# Patient Record
Sex: MALE | Race: WHITE | NOT HISPANIC OR LATINO | Employment: FULL TIME | URBAN - METROPOLITAN AREA
[De-identification: names, ages, dates, MRNs, and addresses within clinical notes are randomized per-mention and may not be internally consistent; named-entity substitution may affect disease eponyms.]

---

## 2020-12-02 ENCOUNTER — OFFICE VISIT (OUTPATIENT)
Dept: INTERNAL MEDICINE | Facility: CLINIC | Age: 50
End: 2020-12-02

## 2020-12-02 VITALS
DIASTOLIC BLOOD PRESSURE: 70 MMHG | OXYGEN SATURATION: 99 % | TEMPERATURE: 97.1 F | HEIGHT: 70 IN | HEART RATE: 66 BPM | RESPIRATION RATE: 18 BRPM | WEIGHT: 200 LBS | SYSTOLIC BLOOD PRESSURE: 122 MMHG | BODY MASS INDEX: 28.63 KG/M2

## 2020-12-02 DIAGNOSIS — F41.1 GENERALIZED ANXIETY DISORDER: ICD-10-CM

## 2020-12-02 DIAGNOSIS — M25.512 ACUTE PAIN OF LEFT SHOULDER: Primary | ICD-10-CM

## 2020-12-02 DIAGNOSIS — N32.81 OVERACTIVE BLADDER: ICD-10-CM

## 2020-12-02 PROBLEM — K44.9 HIATAL HERNIA: Status: ACTIVE | Noted: 2020-12-02

## 2020-12-02 PROCEDURE — 99204 OFFICE O/P NEW MOD 45 MIN: CPT | Performed by: NURSE PRACTITIONER

## 2020-12-02 RX ORDER — PANTOPRAZOLE SODIUM 40 MG/1
TABLET, DELAYED RELEASE ORAL
COMMUNITY
Start: 2020-11-27 | End: 2021-09-30 | Stop reason: SDUPTHER

## 2020-12-02 RX ORDER — OXYBUTYNIN CHLORIDE 10 MG/1
TABLET, EXTENDED RELEASE ORAL
COMMUNITY
End: 2020-12-02 | Stop reason: SDUPTHER

## 2020-12-02 RX ORDER — FAMOTIDINE 20 MG/1
TABLET, FILM COATED ORAL
COMMUNITY
End: 2021-03-11 | Stop reason: SDUPTHER

## 2020-12-02 RX ORDER — OXYBUTYNIN CHLORIDE 10 MG/1
TABLET, EXTENDED RELEASE ORAL
COMMUNITY
Start: 2020-11-28 | End: 2021-03-12

## 2020-12-02 RX ORDER — TADALAFIL 5 MG/1
TABLET ORAL
COMMUNITY
End: 2021-06-08 | Stop reason: SDUPTHER

## 2020-12-02 RX ORDER — MIRTAZAPINE 15 MG/1
TABLET, FILM COATED ORAL
COMMUNITY
End: 2022-08-15

## 2020-12-02 RX ORDER — METHYLPREDNISOLONE 4 MG/1
TABLET ORAL
Qty: 21 TABLET | Refills: 0 | Status: SHIPPED | OUTPATIENT
Start: 2020-12-02 | End: 2020-12-11

## 2020-12-02 RX ORDER — HYDROXYZINE HYDROCHLORIDE 25 MG/1
25 TABLET, FILM COATED ORAL EVERY 8 HOURS PRN
Qty: 60 TABLET | Refills: 1 | Status: SHIPPED | OUTPATIENT
Start: 2020-12-02 | End: 2021-03-12

## 2020-12-02 NOTE — PROGRESS NOTES
Subjective   Clarence Hoang is a 50 y.o. male. Patient is here today for   Chief Complaint   Patient presents with   • Shoulder Pain   • Anxiety   • Establish Care          Vitals:    12/02/20 1003   BP: 122/70   Pulse: 66   Resp: 18   Temp: 97.1 °F (36.2 °C)   SpO2: 99%     Body mass index is 28.7 kg/m².  The following portions of the patient's history were reviewed and updated as appropriate: allergies, current medications, past family history, past medical history, past social history, past surgical history and problem list.    History reviewed. No pertinent past medical history.   No Known Allergies   Social History     Socioeconomic History   • Marital status: Single     Spouse name: Not on file   • Number of children: Not on file   • Years of education: Not on file   • Highest education level: Not on file   Tobacco Use   • Smoking status: Former Smoker     Packs/day: 2.00     Years: 30.00     Pack years: 60.00     Types: Cigarettes     Quit date: 1/1/2017     Years since quitting: 3.9   • Smokeless tobacco: Never Used   Substance and Sexual Activity   • Alcohol use: Not Currently        Current Outpatient Medications:   •  famotidine (PEPCID) 20 MG tablet, , Disp: , Rfl:   •  mirtazapine (REMERON) 15 MG tablet, , Disp: , Rfl:   •  oxybutynin XL (DITROPAN-XL) 10 MG 24 hr tablet, , Disp: , Rfl:   •  pantoprazole (PROTONIX) 40 MG EC tablet, , Disp: , Rfl:   •  tadalafil (CIALIS) 5 MG tablet, , Disp: , Rfl:   •  hydrOXYzine (ATARAX) 25 MG tablet, Take 1 tablet by mouth Every 8 (Eight) Hours As Needed for Anxiety., Disp: 60 tablet, Rfl: 1  •  methylPREDNISolone (MEDROL) 4 MG dose pack, Take as directed on package instructions., Disp: 21 tablet, Rfl: 0     Objective     Norberto is a 50 year old male new patient who is here to establish care. His previous PCP was at the [facility]. I do not have previous records to review. He has generalized anxiety and has been on remeron 15 mg twice daily for over a year.  He states he continues to have anxiety and panic attacks. He has taken celexa in the past which did not work well. He requests an rx for alprazolam to take as needed. He has not seen a psychiatrist in the past. He denies depression.   He has a history of overactive bladder and has seen a urologist. He is taking Ditropan xl with little relief. He was scheduled for further testing at his urologist's office but had to cancel due to covid pandemic.   He also c/o showerd    Shoulder Injury   The left shoulder is affected. There was no injury mechanism (is unsure of any known injury but may have occured when working out ). The quality of the pain is described as aching. The pain does not radiate. The pain is moderate. Pertinent negatives include no chest pain, muscle weakness, numbness or tingling. The symptoms are aggravated by movement and overhead lifting (picking up things ). He has tried rest for the symptoms. The treatment provided no relief.        Review of Systems   Constitutional: Negative for fatigue.   HENT: Negative.    Respiratory: Negative.    Cardiovascular: Negative for chest pain.   Gastrointestinal: Negative.    Genitourinary: Positive for frequency and urgency.   Musculoskeletal:        Left shoulder pain    Neurological: Negative for tingling and numbness.   Psychiatric/Behavioral: Positive for sleep disturbance. Negative for dysphoric mood. The patient is nervous/anxious.        Physical Exam  Vitals signs and nursing note reviewed.   Constitutional:       General: He is awake.      Appearance: Normal appearance. He is well-developed and well-groomed.   HENT:      Head: Normocephalic.   Eyes:      General: Lids are normal.   Cardiovascular:      Rate and Rhythm: Normal rate and regular rhythm.   Pulmonary:      Effort: Pulmonary effort is normal.      Breath sounds: Normal breath sounds.   Musculoskeletal:      Left shoulder: He exhibits decreased range of motion and tenderness. He exhibits no  swelling, no deformity and normal strength.   Neurological:      Mental Status: He is alert.   Psychiatric:         Behavior: Behavior is cooperative.         ASSESSMENT     Problems Addressed this Visit     Generalized anxiety disorder    Relevant Medications    mirtazapine (REMERON) 15 MG tablet    hydrOXYzine (ATARAX) 25 MG tablet      Other Visit Diagnoses     Acute pain of left shoulder    -  Primary    Relevant Orders    Ambulatory Referral to Physical Therapy Evaluate and treat    Overactive bladder        Relevant Medications    oxybutynin XL (DITROPAN-XL) 10 MG 24 hr tablet    tadalafil (CIALIS) 5 MG tablet    Other Relevant Orders    Ambulatory Referral to Urology      Diagnoses       Codes Comments    Acute pain of left shoulder    -  Primary ICD-10-CM: M25.512  ICD-9-CM: 719.41     Overactive bladder     ICD-10-CM: N32.81  ICD-9-CM: 596.51     Generalized anxiety disorder     ICD-10-CM: F41.1  ICD-9-CM: 300.02           PLAN  1. Generalized anxiety- will get previous records from pcp, he can continue current dose of remeron for now, he can take hydroxyzine as needed for panic attacks. Advised not to drive while taking it as it can cause drowsiness.    Discussed other medication options . May refer to psychiatry if needed.   2. Left shoulder pain- referred to PT at [place],. He has seen wilberto brown in the past   3. Overactive bladder- schedule an appt to follow up with urology   Return for Annual physical, with labs , need records from previous PCP.

## 2020-12-03 DIAGNOSIS — Z00.00 ROUTINE HEALTH MAINTENANCE: Primary | ICD-10-CM

## 2020-12-03 DIAGNOSIS — Z11.59 NEED FOR HEPATITIS C SCREENING TEST: ICD-10-CM

## 2020-12-08 ENCOUNTER — HOSPITAL ENCOUNTER (OUTPATIENT)
Dept: PHYSICAL THERAPY | Facility: HOSPITAL | Age: 50
Setting detail: THERAPIES SERIES
Discharge: HOME | End: 2020-12-08

## 2020-12-08 DIAGNOSIS — M25.512 ACUTE PAIN OF LEFT SHOULDER: Primary | ICD-10-CM

## 2020-12-08 LAB
ALBUMIN SERPL-MCNC: 4.9 G/DL (ref 3.5–5.2)
ALBUMIN/GLOB SERPL: 2.1 G/DL
ALP SERPL-CCNC: 44 U/L (ref 39–117)
ALT SERPL-CCNC: 28 U/L (ref 1–41)
APPEARANCE UR: CLEAR
AST SERPL-CCNC: 15 U/L (ref 1–40)
BACTERIA #/AREA URNS HPF: NORMAL /HPF
BASOPHILS # BLD AUTO: 0.03 10*3/MM3 (ref 0–0.2)
BASOPHILS NFR BLD AUTO: 0.3 % (ref 0–1.5)
BILIRUB SERPL-MCNC: 0.2 MG/DL (ref 0–1.2)
BILIRUB UR QL STRIP: NEGATIVE
BUN SERPL-MCNC: 18 MG/DL (ref 6–20)
BUN/CREAT SERPL: 17.5 (ref 7–25)
CALCIUM SERPL-MCNC: 9.6 MG/DL (ref 8.6–10.5)
CASTS URNS MICRO: NORMAL
CHLORIDE SERPL-SCNC: 102 MMOL/L (ref 98–107)
CHOLEST SERPL-MCNC: 206 MG/DL (ref 0–200)
CO2 SERPL-SCNC: 30.5 MMOL/L (ref 22–29)
COLOR UR: YELLOW
CREAT SERPL-MCNC: 1.03 MG/DL (ref 0.76–1.27)
EOSINOPHIL # BLD AUTO: 0 10*3/MM3 (ref 0–0.4)
EOSINOPHIL NFR BLD AUTO: 0 % (ref 0.3–6.2)
EPI CELLS #/AREA URNS HPF: NORMAL /HPF
ERYTHROCYTE [DISTWIDTH] IN BLOOD BY AUTOMATED COUNT: 13 % (ref 12.3–15.4)
GLOBULIN SER CALC-MCNC: 2.3 GM/DL
GLUCOSE SERPL-MCNC: 101 MG/DL (ref 65–99)
GLUCOSE UR QL: NEGATIVE
HCT VFR BLD AUTO: 44.3 % (ref 37.5–51)
HCV AB S/CO SERPL IA: <0.1 S/CO RATIO (ref 0–0.9)
HCV AB SERPL QL IA: NORMAL
HDLC SERPL-MCNC: 69 MG/DL (ref 40–60)
HGB BLD-MCNC: 14.9 G/DL (ref 13–17.7)
HGB UR QL STRIP: NEGATIVE
IMM GRANULOCYTES # BLD AUTO: 0.04 10*3/MM3 (ref 0–0.05)
IMM GRANULOCYTES NFR BLD AUTO: 0.4 % (ref 0–0.5)
KETONES UR QL STRIP: NEGATIVE
LDLC SERPL CALC-MCNC: 123 MG/DL (ref 0–100)
LDLC/HDLC SERPL: 1.75 {RATIO}
LEUKOCYTE ESTERASE UR QL STRIP: NEGATIVE
LYMPHOCYTES # BLD AUTO: 3.9 10*3/MM3 (ref 0.7–3.1)
LYMPHOCYTES NFR BLD AUTO: 35.4 % (ref 19.6–45.3)
MCH RBC QN AUTO: 30 PG (ref 26.6–33)
MCHC RBC AUTO-ENTMCNC: 33.6 G/DL (ref 31.5–35.7)
MCV RBC AUTO: 89.3 FL (ref 79–97)
MONOCYTES # BLD AUTO: 0.71 10*3/MM3 (ref 0.1–0.9)
MONOCYTES NFR BLD AUTO: 6.4 % (ref 5–12)
NEUTROPHILS # BLD AUTO: 6.33 10*3/MM3 (ref 1.7–7)
NEUTROPHILS NFR BLD AUTO: 57.5 % (ref 42.7–76)
NITRITE UR QL STRIP: NEGATIVE
NRBC BLD AUTO-RTO: 0 /100 WBC (ref 0–0.2)
PH UR STRIP: 6.5 [PH] (ref 5–8)
PLATELET # BLD AUTO: 246 10*3/MM3 (ref 140–450)
POTASSIUM SERPL-SCNC: 4.6 MMOL/L (ref 3.5–5.2)
PROT SERPL-MCNC: 7.2 G/DL (ref 6–8.5)
PROT UR QL STRIP: NEGATIVE
RBC # BLD AUTO: 4.96 10*6/MM3 (ref 4.14–5.8)
RBC #/AREA URNS HPF: NORMAL /HPF
SODIUM SERPL-SCNC: 142 MMOL/L (ref 136–145)
SP GR UR: 1.02 (ref 1–1.03)
TRIGL SERPL-MCNC: 80 MG/DL (ref 0–150)
TSH SERPL DL<=0.005 MIU/L-ACNC: 0.63 UIU/ML (ref 0.27–4.2)
UROBILINOGEN UR STRIP-MCNC: NORMAL MG/DL
VLDLC SERPL CALC-MCNC: 14 MG/DL (ref 5–40)
WBC # BLD AUTO: 11.01 10*3/MM3 (ref 3.4–10.8)
WBC #/AREA URNS HPF: NORMAL /HPF

## 2020-12-08 PROCEDURE — 97161 PT EVAL LOW COMPLEX 20 MIN: CPT | Performed by: PHYSICAL THERAPIST

## 2020-12-08 PROCEDURE — 97140 MANUAL THERAPY 1/> REGIONS: CPT | Performed by: PHYSICAL THERAPIST

## 2020-12-08 NOTE — THERAPY EVALUATION
"    Outpatient Physical Therapy Ortho Initial Evaluation  [facility]     Patient Name: Clarence Hoang  : 1970  MRN: [id number]  Today's Date: 2020      Visit Date: 2020    Patient Active Problem List   Diagnosis   • Hiatal hernia   • Generalized anxiety disorder        No past medical history on file.     Past Surgical History:   Procedure Laterality Date   • EYE SURGERY     • HERNIA REPAIR         Visit Dx:     ICD-10-CM ICD-9-CM   1. Acute pain of left shoulder  M25.512 719.41         Patient History     Row Name 20 1300 20 0711          History    Chief Complaint  --  Other 1 (comment)  -GR (r) patient (t)     Hx Chief Complaint Comment 1   --  Rotator cuff  issues  -GR (r) patient (t)     Type of Pain  --  Shoulder pain  -GR (r) patient (t)     Date Current Problem(s) Began  --  20  -GR (r) patient (t)     Brief Description of Current Complaint  1 month history of L shoulder pain, insidious onset he believes from doing too much weight lifting.  He has had a similar problem on the R and also has had deQuervain's and his hand will tingle or \"give out\" intermittently. He has responded well to DDN in the past for a leg problem and is interested in this service again.  Patient works as a  and would also like to return to total body weight lifting.  -GR  Pinching in the shoulder  -GR (r) patient (t)     Patient/Caregiver Goals  --  Relieve pain;Return to prior level of function  -GR (r) patient (t)     Hand Dominance  --  left-handed  -GR (r) patient (t)     Occupation/sports/leisure activities  --   / lift weights daily  -GR (r) patient (t)     Patient seeing anyone else for problem(s)?  --  No  -GR (r) patient (t)     What clinical tests have you had for this problem?  --  Other 1 (comment)  -GR (r) patient (t)     Additional Clinical Tests  --  None  -GR (r) patient (t)        Pain     Pain Location  Shoulder  -GR  --     Pain at Present "  0  -GR  --     Pain at Best  0  -GR  --     Pain at Worst  8  -GR  --     Is your sleep disturbed?  Yes  -GR  --        Fall Risk Assessment    Any falls in the past year:  --  No  -GR (r) patient (t)        Services    Prior Rehab/Home Health Experiences  --  No  -GR (r) patient (t)     Are you currently receiving Home Health services  --  No  -GR (r) patient (t)     Do you plan to receive Home Health services in the near future  --  No  -GR (r) patient (t)        Daily Activities    Primary Language  --  English  -GR (r) patient (t)     How does patient learn best?  --  Demonstration  -GR (r) patient (t)     Pt Participated in POC and Goals  --  Yes  -GR        Safety    Are you being hurt, hit, or frightened by anyone at home or in your life?  --  No  -GR (r) patient (t)     Are you being neglected by a caregiver  --  No  -GR (r) patient (t)     Have you had any of the following issues with  --  Anxiety  -GR (r) patient (t)       User Key  (r) = Recorded By, (t) = Taken By, (c) = Cosigned By    Initials Name Provider Type    GR Kvng Mcwilliams, PT Physical Therapist    patient Clarence Hoang --          PT Ortho     Row Name 12/08/20 1300       Special Tests/Palpation    Special Tests/Palpation  Shoulder  -GR       Shoulder Impingement/Rotator Cuff Special Tests    Devlin-Jg Test (RC Lesion vs. Bursitis)  Bilateral:;Negative  -GR    Neer Impingement Test (RC Lesion vs. Bursitis)  Left:;Positive mild  -GR    Drop Arm Test (Full Thickness RC Lesion)  Bilateral:;Negative  -GR       Shoulder Girdle Palpation    Shoulder Girdle Palpation?  Yes  -GR    Supraspinatus Insertion  Left:;Tender  -GR    Long Head of Biceps  Left:;Tender  -GR    Middle Trap  Left:;Tender  -GR    Rhomboid  Left:;Tender  -GR    Lower Trap  Left:;Tender  -GR       General ROM    RT Upper Ext  Rt Shoulder Flexion;Rt Shoulder ABduction;Rt Shoulder External Rotation;Rt Shoulder Internal Rotation;Rt Elbow Extension/Flexion  -GR    LT  Upper Ext  Lt Shoulder ABduction;Lt Shoulder Flexion;Lt Shoulder External Rotation;Lt Shoulder Internal Rotation;Lt Elbow Extension/Flexion  -GR       Right Upper Ext    Rt Shoulder Abduction AROM  180  -GR    Rt Shoulder Flexion AROM  180  -GR    Rt Shoulder External Rotation AROM  C7  -GR    Rt Shoulder Internal Rotation AROM  T10  -GR       Left Upper Ext    Lt Shoulder Abduction AROM  180  -GR    Lt Shoulder Flexion AROM  180  -GR    Lt Shoulder External Rotation AROM  C7  -GR    Lt Shoulder Internal Rotation AROM  T10  -GR       MMT (Manual Muscle Testing)    Rt Upper Ext  Rt Shoulder Flexion;Rt Shoulder ABduction;Rt Shoulder Internal Rotation;Rt Shoulder External Rotation  -GR    Lt Upper Ext  Lt Shoulder Flexion;Lt Shoulder ABduction;Lt Shoulder Internal Rotation;Lt Shoulder External Rotation  -GR       MMT Right Upper Ext    Rt Shoulder Flexion MMT, Gross Movement  (5/5) normal  -GR    Rt Shoulder ABduction MMT, Gross Movement  (5/5) normal  -GR    Rt Shoulder Internal Rotation MMT, Gross Movement  (5/5) normal  -GR    Rt Shoulder External Rotation MMT, Gross Movement  (5/5) normal  -GR       MMT Left Upper Ext    Lt Shoulder Flexion MMT, Gross Movement  (4+/5) good plus  -GR    Lt Shoulder ABduction MMT, Gross Movement  (4/5) good  -GR    Lt Shoulder Internal Rotation MMT, Gross Movement  (5/5) normal  -GR    Lt Shoulder External Rotation MMT, Gross Movement  (3-/5) fair minus pain  -GR    Lt Upper Extremity Comments   periscap 4-/5  -GR      User Key  (r) = Recorded By, (t) = Taken By, (c) = Cosigned By    Initials Name Provider Type    Kvng Cat, PT Physical Therapist                      Therapy Education  Education Details: medbridge HEP [id number]  Given: HEP, Symptoms/condition management, Posture/body mechanics  Program: New  How Provided: Verbal  Provided to: Patient  Level of Understanding: Teach back education performed, Verbalized, Demonstrated     PT OP Goals     Row Name 12/08/20 1600           PT Short Term Goals    STG Date to Achieve  12/23/20  -GR     STG 1  Patient will be independent with initial HEP.  -GR     STG 1 Progress  New  -GR     STG 2  Patient will comply to activity modification for optimal healing.  -GR     STG 2 Progress  New  -GR     STG 3  Patient will report 25% or greater improvement in sleep per shoulder pain.  -GR     STG 3 Progress  New  -GR        Long Term Goals    LTG Date to Achieve  01/22/21  -GR     LTG 1  Patient will be independent with progressive HEP for long term management of current condition.  -GR     LTG 1 Progress  New  -GR     LTG 2  Patient will score </= 26% disability on the quickDASH to indicate improved perceived performance with ADLs.  -GR     LTG 2 Progress  New  -GR     LTG 3  Patient will demonstrate L ER strength 4/5 or greater per MMT to ease object manipulation.  -GR     LTG 3 Progress  New  -GR     LTG 4  Patient will resume recreational exercise program for community engagement.  -GR     LTG 4 Progress  New  -GR        Time Calculation    PT Goal Re-Cert Due Date  03/08/21  -GR       User Key  (r) = Recorded By, (t) = Taken By, (c) = Cosigned By    Initials Name Provider Type    GR Kvng Mcwilliams, PT Physical Therapist          PT Assessment/Plan     Row Name 12/08/20 1600          PT Assessment    Functional Limitations  Limitation in home management;Limitations in community activities;Limitations in functional capacity and performance;Performance in leisure activities;Performance in sport activities  -GR     Impairments  Joint mobility;Muscle strength;Pain;Posture;Range of motion;Poor body mechanics  -GR     Assessment Comments  50 y.o. L handed M referred to outpatient physical therapy for evaluation and treatment of left shoulder pain.  Patient presents with decreased and painful ER strength, trigger points along the posterior chain and weakness of the posterior chain. Signs and symptoms are consistent with referring diagnosis.   Pertinent comorbidities and personal factors that may affect progress include, but are not limited to, deQuervain's, contralateral shoulder pain and L hand dominance.  This condition is evolving. Recommend skilled PT to address functional deficits. Also a candidate for DDN. Thank you for this referral.   -GR     Please refer to paper survey for additional self-reported information  Yes  -GR     Rehab Potential  Good  -GR     Patient/caregiver participated in establishment of treatment plan and goals  Yes  -GR     Patient would benefit from skilled therapy intervention  Yes  -GR        PT Plan    PT Frequency  2x/week  -GR     Predicted Duration of Therapy Intervention (PT)  8 visits  -GR     Planned CPT's?  PT EVAL LOW COMPLEXITY: 70895;PT RE-EVAL: 34641;PT THER PROC EA 15 MIN: 87477;PT THER ACT EA 15 MIN: 94718;PT MANUAL THERAPY EA 15 MIN: 99652;PT NEUROMUSC RE-EDUCATION EA 15 MIN: 96229;PT SELF CARE/HOME MGMT/TRAIN EA 15: 59484;PT HOT OR COLD PACK TREAT MCARE;PT ELECTRICAL STIM UNATTEND: ;PT ELECTRICAL STIM ATTD EA 15 MIN: 21716  -GR     Physical Therapy Interventions (Optional Details)  home exercise program;joint mobilization;manual therapy techniques;modalities;neuromuscular re-education;patient/family education;postural re-education;ROM (Range of Motion);strengthening;stretching  -GR     PT Plan Comments  Assess response to gua shua and continue if appropriate; will also consider DDN.  Review and perform HEP, progress with posterior chain strength and anterior flexibility.  -GR       User Key  (r) = Recorded By, (t) = Taken By, (c) = Cosigned By    Initials Name Provider Type    GR Kvng Mcwilliams, PT Physical Therapist            OP Exercises     Row Name 12/08/20 1600 12/08/20 1500          Total Minutes    95003 - PT Therapeutic Exercise Minutes  2  -GR  --     69945 - PT Manual Therapy Minutes  --  10  -GR        Exercise 1    Exercise Name 1  [employer] HEP issued: [id number]  -  --       User Key   (r) = Recorded By, (t) = Taken By, (c) = Cosigned By    Initials Name Provider Type    Kvng Cat, PT Physical Therapist           Manual Rx (last 36 hours)      Manual Treatments     Row Name 12/08/20 1500             Total Minutes    45117 - PT Manual Therapy Minutes  10  -GR         Manual Rx 1    Manual Rx 1 Location  L PERISCAP AND UT/LEVATOR  -GR      Manual Rx 1 Type  IASTM WITH SANTIAGO PARKS IN [place]  -GR        User Key  (r) = Recorded By, (t) = Taken By, (c) = Cosigned By    Initials Name Provider Type    Kvng Cat, PT Physical Therapist                      Outcome Measure Options: Quick DASH  Quick DASH  Open a tight or new jar.: No Difficulty  Do heavy household chores (e.g., wash walls, wash floors): No Difficulty  Carry a shopping bag or briefcase: Mild Difficulty  Wash your back: Moderate Difficulty  Use a knife to cut food: Mild Difficulty  Recreational activities in which you take some force or impact through your arm, should or hand (e.g. golf, hammering, tennis, etc.): Severe Difficulty  During the past week, to what extent has your arm, shoulder, or hand problem interfered with your normal social activites with family, friends, neighbors or groups?: Not at all  During the past week, were you limited in your work or other regular daily activities as a result of your arm, shoulder or hand problem?: Moderately Limited  Arm, Shoulder, or hand pain: Moderate  Tingling (pins and needles) in your arm, shoulder, or hand: Moderate  During the past week, how much difficulty have you had sleeping because of the pain in your arm, shoulder or hand?: Severe Difficulty  Number of Questions Answered: 11  Quick DASH Score: 36.36         Time Calculation:     Start Time: 1330  Stop Time: 1415  Time Calculation (min): 45 min  Total Timed Code Minutes- PT: 12 minute(s)     Therapy Charges for Today     Code Description Service Date Service Provider Modifiers Qty    [id number]  PT MANUAL  THERAPY EA 15 MIN 12/8/2020 Kvng Mcwilliams, PT GP 1    [id number] HC PT EVAL LOW COMPLEXITY 2 12/8/2020 Kvng Mcwilliams, PT GP 1          PT G-Codes  Outcome Measure Options: Quick DASH  Quick DASH Score: 36.36         Kvng Mcwilliams, PT  12/8/2020

## 2020-12-10 RX ORDER — EMTRICITABINE AND TENOFOVIR DISOPROXIL FUMARATE 200; 300 MG/1; MG/1
TABLET, FILM COATED ORAL
COMMUNITY
Start: 2019-10-16 | End: 2021-03-12

## 2020-12-10 NOTE — PROGRESS NOTES
Subjective   Clarence Hoang is a 50 y.o. male. Patient is here today for   Chief Complaint   Patient presents with   • Annual Exam   • Anxiety          Vitals:    12/11/20 0758   BP: 122/74   Pulse: 82   Resp: 18   Temp: 97.8 °F (36.6 °C)   SpO2: 98%     Body mass index is 28.44 kg/m².    The following portions of the patient's history were reviewed and updated as appropriate: allergies, current medications, past family history, past medical history, past social history, past surgical history and problem list.    History reviewed. No pertinent past medical history.   No Known Allergies   Social History     Socioeconomic History   • Marital status: Single     Spouse name: Not on file   • Number of children: Not on file   • Years of education: Not on file   • Highest education level: Not on file   Tobacco Use   • Smoking status: Former Smoker     Packs/day: 2.00     Years: 30.00     Pack years: 60.00     Types: Cigarettes     Quit date: 1/1/2017     Years since quitting: 3.9   • Smokeless tobacco: Current User   • Tobacco comment: vaping    Substance and Sexual Activity   • Alcohol use: Not Currently   • Drug use: Never   • Sexual activity: Not Currently        Current Outpatient Medications:   •  emtricitabine-tenofovir (Truvada) 200-300 MG per tablet, , Disp: , Rfl:   •  famotidine (PEPCID) 20 MG tablet, , Disp: , Rfl:   •  hydrOXYzine (ATARAX) 25 MG tablet, Take 1 tablet by mouth Every 8 (Eight) Hours As Needed for Anxiety., Disp: 60 tablet, Rfl: 1  •  mirtazapine (REMERON) 15 MG tablet, , Disp: , Rfl:   •  oxybutynin XL (DITROPAN-XL) 10 MG 24 hr tablet, , Disp: , Rfl:   •  pantoprazole (PROTONIX) 40 MG EC tablet, , Disp: , Rfl:   •  tadalafil (CIALIS) 5 MG tablet, , Disp: , Rfl:   •  ALPRAZolam (XANAX) 0.5 MG tablet, Take 1 tablet by mouth 2 (Two) Times a Day As Needed for Anxiety., Disp: 10 tablet, Rfl: 0     EKG  ECG Report   ECG 12 Lead    Date/Time: 12/11/2020 12:18 PM  Performed by: Megan Crowley  OTILIA  Authorized by: Megan Crowley APRN   Comparison: not compared with previous ECG   Previous ECG: no previous ECG available  Rhythm: sinus rhythm  Rate: normal  QRS axis: normal    Clinical impression: normal ECG              Objective   History of Present Illness   Clarence Haong 50 y.o. male who presents for an Annual Wellness Visit.  he has a history of   Patient Active Problem List   Diagnosis   • Hiatal hernia   • Generalized anxiety disorder   .  he has generalized anxiety and is on remeron. He was prescribed  hydroxyzine but it hasn't worked well for him for panic attacks. He has taken  Alprazolam in the past. .  Labs results discussed in detail with the patient.  Plan to update vaccines if needed today.    Health Habits:  Dental Exam. up to date  Eye Exam. up to date  Exercise: 7 times/week.  Current exercise activities include: weightlifting        Lab Results (most recent)     Orders Only on 12/03/2020   Component Date Value Ref Range Status   • WBC 12/07/2020 11.01* 3.40 - 10.80 10*3/mm3 Final   • RBC 12/07/2020 4.96  4.14 - 5.80 10*6/mm3 Final   • Hemoglobin 12/07/2020 14.9  13.0 - 17.7 g/dL Final   • Hematocrit 12/07/2020 44.3  37.5 - 51.0 % Final   • MCV 12/07/2020 89.3  79.0 - 97.0 fL Final   • MCH 12/07/2020 30.0  26.6 - 33.0 pg Final   • MCHC 12/07/2020 33.6  31.5 - 35.7 g/dL Final   • RDW 12/07/2020 13.0  12.3 - 15.4 % Final   • Platelets 12/07/2020 246  140 - 450 10*3/mm3 Final   • Neutrophil Rel % 12/07/2020 57.5  42.7 - 76.0 % Final   • Lymphocyte Rel % 12/07/2020 35.4  19.6 - 45.3 % Final   • Monocyte Rel % 12/07/2020 6.4  5.0 - 12.0 % Final   • Eosinophil Rel % 12/07/2020 0.0* 0.3 - 6.2 % Final   • Basophil Rel % 12/07/2020 0.3  0.0 - 1.5 % Final   • Neutrophils Absolute 12/07/2020 6.33  1.70 - 7.00 10*3/mm3 Final   • Lymphocytes Absolute 12/07/2020 3.90* 0.70 - 3.10 10*3/mm3 Final   • Monocytes Absolute 12/07/2020 0.71  0.10 - 0.90 10*3/mm3 Final   • Eosinophils Absolute  12/07/2020 0.00  0.00 - 0.40 10*3/mm3 Final   • Basophils Absolute 12/07/2020 0.03  0.00 - 0.20 10*3/mm3 Final   • Immature Granulocyte Rel % 12/07/2020 0.4  0.0 - 0.5 % Final   • Immature Grans Absolute 12/07/2020 0.04  0.00 - 0.05 10*3/mm3 Final   • nRBC 12/07/2020 0.0  0.0 - 0.2 /100 WBC Final   • Glucose 12/07/2020 101* 65 - 99 mg/dL Final   • BUN 12/07/2020 18  6 - 20 mg/dL Final   • Creatinine 12/07/2020 1.03  0.76 - 1.27 mg/dL Final   • eGFR Non  Am 12/07/2020 77  >60 mL/min/1.73 Final   • eGFR African Am 12/07/2020 93  >60 mL/min/1.73 Final   • BUN/Creatinine Ratio 12/07/2020 17.5  7.0 - 25.0 Final   • Sodium 12/07/2020 142  136 - 145 mmol/L Final   • Potassium 12/07/2020 4.6  3.5 - 5.2 mmol/L Final   • Chloride 12/07/2020 102  98 - 107 mmol/L Final   • Total CO2 12/07/2020 30.5* 22.0 - 29.0 mmol/L Final   • Calcium 12/07/2020 9.6  8.6 - 10.5 mg/dL Final   • Total Protein 12/07/2020 7.2  6.0 - 8.5 g/dL Final   • Albumin 12/07/2020 4.90  3.50 - 5.20 g/dL Final   • Globulin 12/07/2020 2.3  gm/dL Final   • A/G Ratio 12/07/2020 2.1  g/dL Final   • Total Bilirubin 12/07/2020 0.2  0.0 - 1.2 mg/dL Final   • Alkaline Phosphatase 12/07/2020 44  39 - 117 U/L Final   • AST (SGOT) 12/07/2020 15  1 - 40 U/L Final   • ALT (SGPT) 12/07/2020 28  1 - 41 U/L Final   • Hepatitis C Ab 12/07/2020 <0.1  0.0 - 0.9 s/co ratio Final   • Total Cholesterol 12/07/2020 206* 0 - 200 mg/dL Final   • Triglycerides 12/07/2020 80  0 - 150 mg/dL Final   • HDL Cholesterol 12/07/2020 69* 40 - 60 mg/dL Final   • VLDL Cholesterol Dami 12/07/2020 14  5 - 40 mg/dL Final   • LDL Chol Calc ([facility]) 12/07/2020 123* 0 - 100 mg/dL Final   • LDL/HDL RATIO 12/07/2020 1.75   Final   • TSH 12/07/2020 0.634  0.270 - 4.200 uIU/mL Final   • Specific Gravity, UA 12/07/2020 1.022  1.005 - 1.030 Final   • pH, UA 12/07/2020 6.5  5.0 - 8.0 Final   • Color, UA 12/07/2020 Yellow   Final    REFERENCE RANGE: Yellow, Straw   • Appearance, UA 12/07/2020 Clear  Clear  Final   • Leukocytes, UA 12/07/2020 Negative  Negative Final   • Protein 12/07/2020 Negative  Negative Final   • Glucose, UA 12/07/2020 Negative  Negative Final   • Ketones 12/07/2020 Negative  Negative Final   • Blood, UA 12/07/2020 Negative  Negative Final   • Bilirubin, UA 12/07/2020 Negative  Negative Final   • Urobilinogen, UA 12/07/2020 Comment   Final    Comment: 0.2 E.U./dL  REFERENCE RANGE: 0.2 - 1.0 E.U./dL     • Nitrite, UA 12/07/2020 Negative  Negative Final   • WBC, UA 12/07/2020 0-2  /HPF Final    REFERENCE RANGE: None Seen, 0-2   • RBC, UA 12/07/2020 0-2  /HPF Final    REFERENCE RANGE: None Seen, 0-2   • Epithelial Cells (non renal) 12/07/2020 0-2  /HPF Final    REFERENCE RANGE: None Seen, 0-2   • Cast Type 12/07/2020 Comment   Final    HYALINE CASTS, UA            None Seen        /LPF       None Seen  N   • Bacteria, UA 12/07/2020 Comment  None Seen /HPF Final    None Seen   • Interpretation 12/07/2020 Comment   Final    Comment: Negative  Not infected with HCV, unless recent infection is suspected or other  evidence exists to indicate HCV infection.                   Review of Systems   Constitutional: Negative for chills, fatigue and fever.   HENT: Negative.    Respiratory: Negative for cough, chest tightness, shortness of breath and wheezing.    Gastrointestinal: Negative for abdominal distention, abdominal pain, anal bleeding, blood in stool and rectal pain.   Genitourinary: Positive for frequency. Negative for dysuria and testicular pain.   Musculoskeletal: Negative for arthralgias.   Neurological: Negative for dizziness and headaches.   Psychiatric/Behavioral: Negative for dysphoric mood and sleep disturbance. The patient is nervous/anxious.        Physical Exam  Vitals signs and nursing note reviewed.   Constitutional:       Appearance: Normal appearance. He is well-developed and well-groomed.   HENT:      Head: Normocephalic.      Right Ear: Tympanic membrane, ear canal and external ear  normal.      Left Ear: Tympanic membrane, ear canal and external ear normal.      Ears:      Comments: Mask on   Eyes:      General: Lids are normal.      Pupils: Pupils are equal, round, and reactive to light.   Neck:      Musculoskeletal: Full passive range of motion without pain.      Thyroid: No thyromegaly.   Cardiovascular:      Rate and Rhythm: Normal rate and regular rhythm.   Pulmonary:      Effort: Pulmonary effort is normal.      Breath sounds: Normal breath sounds.   Abdominal:      General: Bowel sounds are normal.      Palpations: Abdomen is soft.      Tenderness: There is no abdominal tenderness.   Skin:     General: Skin is warm and dry.   Neurological:      Mental Status: He is alert and oriented to person, place, and time.   Psychiatric:         Attention and Perception: Attention normal.         Mood and Affect: Mood normal.         Behavior: Behavior is cooperative.         ASSESSMENT       Problems Addressed this Visit     Generalized anxiety disorder    Relevant Medications    ALPRAZolam (XANAX) 0.5 MG tablet    Other Relevant Orders    Ambulatory Referral to Psychiatry      Other Visit Diagnoses     Annual physical exam    -  Primary    Relevant Orders    ECG 12 Lead    Anxiety        Panic attacks        Relevant Medications    ALPRAZolam (XANAX) 0.5 MG tablet    Other Relevant Orders    Ambulatory Referral to Psychiatry    Need for Tdap vaccination        Relevant Orders    Tdap Vaccine Greater Than or Equal To 8yo IM    Encounter for screening colonoscopy        Relevant Orders    Ambulatory Referral to General Surgery      Diagnoses       Codes Comments    Annual physical exam    -  Primary ICD-10-CM: Z00.00  ICD-9-CM: V70.0     Anxiety     ICD-10-CM: F41.9  ICD-9-CM: 300.00     Panic attacks     ICD-10-CM: F41.0  ICD-9-CM: 300.01     Generalized anxiety disorder     ICD-10-CM: F41.1  ICD-9-CM: 300.02     Need for Tdap vaccination     ICD-10-CM: Z23  ICD-9-CM: V06.1     Encounter for  screening colonoscopy     ICD-10-CM: Z12.11  ICD-9-CM: V76.51           PLAN    Over all his labs look good. Wbc was slightly elevated - is likely due to medrol dosepak  Fasting glucose slightly elevated at 102   Tc was slightly elevated at 206 and ldl is 123  BP is normal   Continue exercise , discussed dietary changes, he is working on weight loss  tdap today  Will refer for colonoscopy  Urology referral was placed last visit for urinary frequency  Hydroxyzine did not help with panic attacks. Alprazolam has worked in the past. I will give him #10 tablets and he can take them as needed sparingly, will refer to psychiatry for further medication management.  Awaiting previous records from PCP.    Return in one year for Annual physical with labs or sooner if needed

## 2020-12-11 ENCOUNTER — OFFICE VISIT (OUTPATIENT)
Dept: INTERNAL MEDICINE | Facility: CLINIC | Age: 50
End: 2020-12-11

## 2020-12-11 VITALS
DIASTOLIC BLOOD PRESSURE: 74 MMHG | OXYGEN SATURATION: 98 % | WEIGHT: 198.2 LBS | TEMPERATURE: 97.8 F | HEIGHT: 70 IN | HEART RATE: 82 BPM | RESPIRATION RATE: 18 BRPM | BODY MASS INDEX: 28.37 KG/M2 | SYSTOLIC BLOOD PRESSURE: 122 MMHG

## 2020-12-11 DIAGNOSIS — F41.0 PANIC ATTACKS: ICD-10-CM

## 2020-12-11 DIAGNOSIS — Z23 NEED FOR TDAP VACCINATION: ICD-10-CM

## 2020-12-11 DIAGNOSIS — F41.1 GENERALIZED ANXIETY DISORDER: ICD-10-CM

## 2020-12-11 DIAGNOSIS — Z12.11 ENCOUNTER FOR SCREENING COLONOSCOPY: ICD-10-CM

## 2020-12-11 DIAGNOSIS — Z00.00 ANNUAL PHYSICAL EXAM: Primary | ICD-10-CM

## 2020-12-11 DIAGNOSIS — F41.9 ANXIETY: ICD-10-CM

## 2020-12-11 PROCEDURE — 90471 IMMUNIZATION ADMIN: CPT | Performed by: NURSE PRACTITIONER

## 2020-12-11 PROCEDURE — 90715 TDAP VACCINE 7 YRS/> IM: CPT | Performed by: NURSE PRACTITIONER

## 2020-12-11 PROCEDURE — 99396 PREV VISIT EST AGE 40-64: CPT | Performed by: NURSE PRACTITIONER

## 2020-12-11 PROCEDURE — 93000 ELECTROCARDIOGRAM COMPLETE: CPT | Performed by: NURSE PRACTITIONER

## 2020-12-11 PROCEDURE — 99999 PR OFFICE/OUTPT VISIT,PROCEDURE ONLY: CPT | Performed by: NURSE PRACTITIONER

## 2020-12-11 RX ORDER — ALPRAZOLAM 0.5 MG/1
0.5 TABLET ORAL 2 TIMES DAILY PRN
Qty: 10 TABLET | Refills: 0 | Status: SHIPPED | OUTPATIENT
Start: 2020-12-11 | End: 2022-08-15

## 2020-12-15 ENCOUNTER — HOSPITAL ENCOUNTER (OUTPATIENT)
Dept: PHYSICAL THERAPY | Facility: HOSPITAL | Age: 50
Setting detail: THERAPIES SERIES
Discharge: HOME | End: 2020-12-15

## 2020-12-15 DIAGNOSIS — M25.512 ACUTE PAIN OF LEFT SHOULDER: Primary | ICD-10-CM

## 2020-12-15 PROCEDURE — 97110 THERAPEUTIC EXERCISES: CPT | Performed by: PHYSICAL THERAPIST

## 2020-12-15 PROCEDURE — 97140 MANUAL THERAPY 1/> REGIONS: CPT | Performed by: PHYSICAL THERAPIST

## 2020-12-15 NOTE — THERAPY TREATMENT NOTE
"    Outpatient Physical Therapy Ortho Treatment Note  [facility]     Patient Name: Clarence Hoang  : 1970  MRN: [id number]  Today's Date: 12/15/2020      Visit Date: 12/15/2020    Visit Dx:    ICD-10-CM ICD-9-CM   1. Acute pain of left shoulder  M25.512 719.41       Patient Active Problem List   Diagnosis   • Hiatal hernia   • Generalized anxiety disorder        No past medical history on file.     Past Surgical History:   Procedure Laterality Date   • EYE SURGERY     • HERNIA REPAIR             PT Assessment/Plan     Row Name 12/15/20 1000          PT Assessment    Assessment Comments  Patient returns for 1st follow up. Able to avoid \"pinching\" complaints when in scapular plane, encouraged positional modification for home and with exercise for optimal results. Continued gua shua as patient perseverates on need for dry needling which is scheduled for next session.  Appreciated significant petechia in the axillary border with patient c/o no immediate adverse pain following.    -GR        PT Plan    PT Plan Comments  Continue posterior chain strengthening, anterior chest stretching and initiate DDN.  -GR       User Key  (r) = Recorded By, (t) = Taken By, (c) = Cosigned By    Initials Name Provider Type    GR Kvng Mcwilliams, PT Physical Therapist            OP Exercises     Row Name 12/15/20 1000 12/15/20 0900          Subjective Comments    Subjective Comments  No significant changes since IE. Still some pinching. Did the HEP 2 or 3 times.  -GR  --        Subjective Pain    Able to rate subjective pain?  yes  -GR  --        Total Minutes    38701 - PT Therapeutic Exercise Minutes  28  -GR  --     76071 - PT Manual Therapy Minutes  --  12  -GR        Exercise 1    Exercise Name 1  UBE  -GR  --     Cueing 1  Demo  -GR  --     Time 1  4 min  -GR  --     Additional Comments  L1  -GR  --        Exercise 2    Exercise Name 2  Doorway pec stretch  -GR  --     Cueing 2  Demo  -GR  --     Sets 2  1  -GR  --  "    Reps 2  3  -GR  --     Time 2  20 seconds  -GR  --        Exercise 3    Exercise Name 3  ER isometric   -GR  --     Cueing 3  Demo  -GR  --     Sets 3  1  -GR  --     Reps 3  10  -GR  --     Additional Comments  PINCHING  -GR  --        Exercise 4    Exercise Name 4  PRONE I'S T'S Y'S   -GR  --     Cueing 4  Demo  -GR  --     Sets 4  1  -GR  --     Reps 4  15  -GR  --     Additional Comments  over swiss ball on table  -GR  --        Exercise 5    Exercise Name 5  Wall push up wiss ball  -GR  --     Cueing 5  Demo  -GR  --     Sets 5  1  -GR  --     Reps 5  15  -GR  --        Exercise 6    Exercise Name 6  SL trunk rotation with c/spine rotation  -GR  --     Cueing 6  Demo  -GR  --     Sets 6  1  -GR  --     Reps 6  15  -GR  --        Exercise 7    Exercise Name 7  Seated thoracic extension   -GR  --     Cueing 7  Demo  -GR  --     Sets 7  1  -GR  --     Reps 7  10  -GR  --     Time 7  10 sec  -GR  --     Additional Comments  bolster mid back  -GR  --       User Key  (r) = Recorded By, (t) = Taken By, (c) = Cosigned By    Initials Name Provider Type    GR Kvng Mcwilliams, PT Physical Therapist                      Manual Rx (last 36 hours)      Manual Treatments     Row Name 12/15/20 0900             Total Minutes    52294 - PT Manual Therapy Minutes  12  -GR         Manual Rx 1    Manual Rx 1 Location  L PERISCAP AND UT/LEVATOR  -GR      Manual Rx 1 Type  IASTM WITH GUA SHUA IN SIDELYING  -GR      Manual Rx 1 Duration  petechiae appreciated inferior and axillary border  -GR        User Key  (r) = Recorded By, (t) = Taken By, (c) = Cosigned By    Initials Name Provider Type    GR Kvng Mcwilliams, PT Physical Therapist              Therapy Education  Education Details: updated mu HEP with previous code and discussed plan for DDN next session              Time Calculation:   Start Time: 1000  Stop Time: 1040  Time Calculation (min): 40 min  Total Timed Code Minutes- PT: 40 minute(s)  Therapy Charges for  Today     Code Description Service Date Service Provider Modifiers Qty    43594015171  PT THER PROC EA 15 MIN 12/15/2020 Kvng Mcwilliams, PT GP 2    70567393519 HC PT MANUAL THERAPY EA 15 MIN 12/15/2020 Kvng Mcwilliams, PT GP 1                    Kvng Mcwilliams, PT  12/15/2020

## 2020-12-17 ENCOUNTER — HOSPITAL ENCOUNTER (OUTPATIENT)
Dept: PHYSICAL THERAPY | Facility: HOSPITAL | Age: 50
Setting detail: THERAPIES SERIES
Discharge: HOME | End: 2020-12-17

## 2020-12-17 DIAGNOSIS — M25.512 ACUTE PAIN OF LEFT SHOULDER: Primary | ICD-10-CM

## 2020-12-17 NOTE — THERAPY TREATMENT NOTE
Outpatient Physical Therapy Ortho Treatment Note  [facility]     Patient Name: Clarence Hoang  : 1970  MRN: [id number]  Today's Date: 2020      Visit Date: 2020    Visit Dx:    ICD-10-CM ICD-9-CM   1. Acute pain of left shoulder  M25.512 719.41       Patient Active Problem List   Diagnosis   • Hiatal hernia   • Generalized anxiety disorder        No past medical history on file.     Past Surgical History:   Procedure Laterality Date   • EYE SURGERY     • HERNIA REPAIR                         PT Assessment/Plan     Row Name 20 1143          PT Assessment    Assessment Comments  Mr. Hoang returns for trial of DDN for LUE. Following informed/written consent we initiated trial of DDN to L shoulder girdle tissues, with most activity noted in L lattisimus dorsi and L middle deltoid. Reviewed activity modification.  He reported greater ease/deccreased pain while donning shirt post DDN. Mr. Hoang conntinues to be a good candidate for physical therapy.  -GJ        PT Plan    PT Plan Comments  assess response to DDN of L scapular girdle tissues.  Possible repeat of DDN, vs. postural strengthening/scapulalr girdle strengthening/RC strengthening  -       User Key  (r) = Recorded By, (t) = Taken By, (c) = Cosigned By    Initials Name Provider Type    Saad Smith, PT Physical Therapist            OP Exercises     Row Name 20 1100             Subjective Comments    Subjective Comments  I really want to try the needling  - Assessed pt. for Dry Needling and intramuscular manual therapy. Discussed risks/benefits of Dry Needling with pt, including but not limited to muscle soreness, bruising, vasovagal response, pneumothorax, nerve injury. Patient signed written consent to proceed with treatment.    Patient position during treatment:   Muscles treated:   Response to treatment:     palpation used before, during, and after to facilitate assessment Clean needle technique observed  "at all times, precautions for lung fields, neurovascular structures observed.    DDN performed by Saad Ace II, PT, DPT       User Key  (r) = Recorded By, (t) = Taken By, (c) = Cosigned By    Initials Name Provider Type    Saad Smith, PT Physical Therapist                      Manual Rx (last 36 hours)      Manual Treatments     Row Name 12/17/20 0900             Manual Rx 3    Manual Rx 3 Location  intramuscular manual therapy  -GJ Assessed pt. for Dry Needling and intramuscular manual therapy. Discussed risks/benefits of Dry Needling with pt, including but not limited to muscle soreness, bruising, vasovagal response, pneumothorax, nerve injury. Patient signed written consent to proceed with treatment.    Patient position during treatment: prone, nose in nose hole and seated  Muscles treated: L lattisimus dorsi, L terres major/minor, L infraspinatus, L UT, L middle deltoid.  Response to treatment: multiple moderate to large LTR\"s noted in L lattsimus and L middle deltoid tissues.  Several smaller LTR's noted L UT.   Minimal activity L infraspinatus.  Pt reports less pain while donning shirt post DDN. No immediate adverse response.     palpation used before, during, and after to facilitate assessment Clean needle technique observed at all times, precautions for lung fields, neurovascular structures observed.    DDN performed by Saad Ace II, PT, DPT     Manual Rx 3 Type  L lattisimus dorsi, L infraspinatus, L middle deltoid, L UT  -GJ        User Key  (r) = Recorded By, (t) = Taken By, (c) = Cosigned By    Initials Name Provider Type    Saad Smith, PT Physical Therapist          PT OP Goals     Row Name 12/17/20 1000          PT Short Term Goals    STG Date to Achieve  12/23/20  -GJ     STG 1  Patient will be independent with initial HEP.  -GJ     STG 1 Progress  Ongoing  -GJ     STG 2  Patient will comply to activity modification for optimal healing.  -GJ     STG 2 Progress  Ongoing  -GJ "     STG 2 Progress Comments  reviewd activity modifications and encouraged to back up on lifting at the gym  -GJ     STG 3  Patient will report 25% or greater improvement in sleep per shoulder pain.  -GJ     STG 3 Progress  Ongoing  -GJ        Long Term Goals    LTG Date to Achieve  01/22/21  -GJ     LTG 1  Patient will be independent with progressive HEP for long term management of current condition.  -GJ     LTG 1 Progress  Ongoing  -GJ     LTG 2  Patient will score </= 26% disability on the quickDASH to indicate improved perceived performance with ADLs.  -GJ     LTG 2 Progress  Ongoing  -GJ     LTG 3  Patient will demonstrate L ER strength 4/5 or greater per MMT to ease object manipulation.  -GJ     LTG 3 Progress  Ongoing  -GJ     LTG 4  Patient will resume recreational exercise program for community engagement.  -GJ     LTG 4 Progress  Ongoing  -GJ       User Key  (r) = Recorded By, (t) = Taken By, (c) = Cosigned By    Initials Name Provider Type    Saad Smith, PT Physical Therapist          Therapy Education  Education Details: discussed risks/benefits of DDN, discussed the idea that DDN wasn't a magic bullet, but more an adjunct to current therapy, requiring continued adherence to HEP/activity modifications already provided.  he voiced understanding and wished to proceed              Time Calculation:   Start Time: 1049  Stop Time: 1120  Time Calculation (min): 31 min  Therapy Charges for Today     Code Description Service Date Service Provider Modifiers Qty    [id number]  PT SELF PAY DRY NEEDLING SESSION 12/17/2020 Saad Ace, PT  1                    Saad Ace, PT  12/17/2020

## 2020-12-28 ENCOUNTER — HOSPITAL ENCOUNTER (OUTPATIENT)
Dept: PHYSICAL THERAPY | Facility: HOSPITAL | Age: 50
Setting detail: THERAPIES SERIES
Discharge: HOME | End: 2020-12-28

## 2020-12-28 DIAGNOSIS — M25.512 ACUTE PAIN OF LEFT SHOULDER: Primary | ICD-10-CM

## 2020-12-28 PROCEDURE — 97110 THERAPEUTIC EXERCISES: CPT | Performed by: PHYSICAL THERAPIST

## 2020-12-28 PROCEDURE — 97140 MANUAL THERAPY 1/> REGIONS: CPT | Performed by: PHYSICAL THERAPIST

## 2020-12-29 NOTE — THERAPY TREATMENT NOTE
Outpatient Physical Therapy Ortho Treatment Note  [facility]     Patient Name: Clarence Hoang  : 1970  MRN: [id number]  Today's Date: 2020      Visit Date: 2020    Visit Dx:    ICD-10-CM ICD-9-CM   1. Acute pain of left shoulder  M25.512 719.41       Patient Active Problem List   Diagnosis   • Hiatal hernia   • Generalized anxiety disorder        No past medical history on file.     Past Surgical History:   Procedure Laterality Date   • EYE SURGERY     • HERNIA REPAIR             20 0900   Exercise 1   Exercise Name 1 UBE   Cueing 1 Demo   Time 1 4 min   Exercise 2   Exercise Name 2 Doorway pec stretch   Cueing 2 Demo   Sets 2 1   Reps 2 3   Time 2 20 seconds   Exercise 3   Exercise Name 3 ER AROM    Cueing 3 Demo;Verbal   Sets 3 1   Reps 3 10   Exercise 4   Exercise Name 4 *deferred today - PRONE I'S T'S Y'S    Cueing 4 Demo   Sets 4 1   Reps 4 15   Exercise 5   Exercise Name 5 *deferred today - Wall push up wiss ball   Cueing 5 Demo   Sets 5 1   Reps 5 15   Exercise 6   Exercise Name 6 SL trunk rotation with c/spine rotation   Cueing 6 Demo   Sets 6 1   Reps 6 15   Exercise 7   Exercise Name 7 Seated thoracic extension    Cueing 7 Demo   Sets 7 1   Reps 7 10   Time 7 10 sec   Exercise 8   Exercise Name 8 Lat pull downs (seated)   Cueing 8 Verbal;Tactile;Demo   Reps 8 15   Exercise 9   Exercise Name 9 beach position stretch (supine, hands behind head)   Reps 9 2   Time 9 15 sec                                         Manual Rx (last 36 hours)      Manual Treatments     Row Name 20 0900             Total Minutes    44066 - PT Manual Therapy Minutes  14  -RA         Manual Rx 1    Manual Rx 1 Location  L PERISCAP AND UT/LEVATOR  -RA      Manual Rx 1 Type  IASTM WITH GUA SHUA IN SIDELYING  -RA      Manual Rx 1 Duration  petechiae appreciated along UT tissues  -RA         Manual Rx 2    Manual Rx 2 Location  passive L scap retraction/depresiion michael miranda at  joint for ant  shoulder stretch (patient in R SL)  -SERGE        User Key  (r) = Recorded By, (t) = Taken By, (c) = Cosigned By    Initials Name Provider Type    Sophie Ryan, PT Physical Therapist              Therapy Education  Education Details: Discussed need for postterior shoulder/scap and mid/lower back strengthening along with anterior chest stretching.  Given: HEP, Symptoms/condition management, Posture/body mechanics  Program: Reinforced, New  How Provided: Verbal, Demonstration  Provided to: Patient  Level of Understanding: Teach back education performed, Verbalized, Demonstrated              Time Calculation:   Start Time: 0913  Stop Time: 0954  Time Calculation (min): 41 min  Therapy Charges for Today     Code Description Service Date Service Provider Modifiers Qty    [id number]  PT THER PROC EA 15 MIN 12/28/2020 Sophie Beverly, PT GP 2    [id number]  PT MANUAL THERAPY EA 15 MIN 12/28/2020 Sophie Beverly, PT GP 1                    Sophie Beverly, JACQUES  12/29/2020

## 2020-12-31 ENCOUNTER — HOSPITAL ENCOUNTER (OUTPATIENT)
Dept: PHYSICAL THERAPY | Facility: HOSPITAL | Age: 50
Setting detail: THERAPIES SERIES
Discharge: HOME | End: 2020-12-31

## 2020-12-31 DIAGNOSIS — M25.512 ACUTE PAIN OF LEFT SHOULDER: Primary | ICD-10-CM

## 2021-01-07 ENCOUNTER — HOSPITAL ENCOUNTER (OUTPATIENT)
Dept: PHYSICAL THERAPY | Facility: HOSPITAL | Age: 51
Setting detail: THERAPIES SERIES
Discharge: HOME | End: 2021-01-07

## 2021-01-07 ENCOUNTER — PREP FOR SURGERY (OUTPATIENT)
Dept: OTHER | Facility: HOSPITAL | Age: 51
End: 2021-01-07

## 2021-01-07 DIAGNOSIS — M25.512 ACUTE PAIN OF LEFT SHOULDER: Primary | ICD-10-CM

## 2021-01-07 DIAGNOSIS — Z12.11 SCREEN FOR COLON CANCER: Primary | ICD-10-CM

## 2021-01-07 PROCEDURE — 97110 THERAPEUTIC EXERCISES: CPT | Performed by: PHYSICAL THERAPIST

## 2021-01-07 PROCEDURE — 97530 THERAPEUTIC ACTIVITIES: CPT | Performed by: PHYSICAL THERAPIST

## 2021-01-07 NOTE — THERAPY PROGRESS REPORT/RE-CERT
Outpatient Physical Therapy Ortho Progress Note  [facility]     Patient Name: Clarence Hoang  : 1970  MRN: [id number]  Today's Date: 2021      Visit Date: 2021    Visit Dx:    ICD-10-CM ICD-9-CM   1. Acute pain of left shoulder  M25.512 719.41       Patient Active Problem List   Diagnosis   • Hiatal hernia   • Generalized anxiety disorder        No past medical history on file.     Past Surgical History:   Procedure Laterality Date   • EYE SURGERY     • HERNIA REPAIR         PT Ortho     Row Name 21 0600       Right Upper Ext    Rt Shoulder Flexion AROM  155 seated  -GJ       Left Upper Ext    Lt Shoulder Abduction AROM  165 seated  -GJ    Lt Shoulder Flexion AROM  160 seated  -GJ    Lt Shoulder Internal Rotation AROM  FIR midline L3/4  -GJ       MMT Left Upper Ext    Lt Shoulder Internal Rotation MMT, Gross Movement  (5/5) normal  -GJ    Lt Shoulder External Rotation MMT, Gross Movement  (4-/5) good minus painful  -GJ      User Key  (r) = Recorded By, (t) = Taken By, (c) = Cosigned By    Initials Name Provider Type    Saad Smith, PT Physical Therapist                      PT Assessment/Plan     Row Name 21 0711          PT Assessment    Functional Limitations  Limitation in home management;Limitations in community activities;Performance in leisure activities;Performance in work activities  -     Impairments  Impaired flexibility;Range of motion;Posture;Poor body mechanics;Pain;Muscle strength;Impaired postural alignment;Impaired muscle length;Impaired muscle endurance  -     Assessment Comments  Mr. Hoang is a 52 yo L handed male.  He has attended 6 sessions of physical therapy for his L shoulder pain.  He reports improvement in his condition. He demonstrates improving L shoulder strength, continues to be weak/painful with L ER MMT (however improved).  We reviewed postural implications, importance of setting scapula with activity.  Today, we focused more on  posterior scapular girdle strengthening and RC strengthening.  He reports a quick DASH of 34% down from 36% at initial evaluation, not an MCID.  He has met all STG's adn partially met 1 of 4 LTG's. He is progressing toward all remaining goals and remains a good candidate for skilled physical therapy.  -     Please refer to paper survey for additional self-reported information  Yes  -GJ     Rehab Potential  Good  -GJ     Patient/caregiver participated in establishment of treatment plan and goals  Yes  -GJ     Patient would benefit from skilled therapy intervention  Yes  -GJ        PT Plan    PT Frequency  1x/week;2x/week  -GJ     Predicted Duration of Therapy Intervention (PT)  10 visits  -GJ     Planned CPT's?  PT RE-EVAL: 36258;PT THER PROC EA 15 MIN: 93707;PT THER ACT EA 15 MIN: 63603;PT MANUAL THERAPY EA 15 MIN: 59637;PT NEUROMUSC RE-EDUCATION EA 15 MIN: 80830;PT HOT OR COLD PACK TREAT MCARE;PT ELECTRICAL STIM UNATTEND: ;PT ULTRASOUND EA 15 MIN: 24140  -     PT Plan Comments  assess response to strengthening.  Work on RC/posterior scapular girdle strength, to see once a week for 4 weeks, possible DDN of middle deltoid  -GJ       User Key  (r) = Recorded By, (t) = Taken By, (c) = Cosigned By    Initials Name Provider Type    Saad Smith, PT Physical Therapist            OP Exercises     Row Name 01/07/21 0659 01/07/21 0600          Subjective Comments    Subjective Comments  --  I'm doing better, my primary concern is pain (albeit better), when lifting somethign like a gallon of milk   -        Subjective Pain    Subjective Pain Comment  --  3/10 at worse  -        Total Minutes    01925 - PT Therapeutic Exercise Minutes  32  -GJ  --     39871 - PT Therapeutic Activity Minutes  10 education/assessment  -  --        Exercise 1    Exercise Name 1  --  UBE  -GJ     Time 1  --  4 min  -GJ        Exercise 2    Exercise Name 2  --  Doorway pec stretch  -     Cueing 2  --  Demo  -     Reps 2  --   3  -GJ     Time 2  --  20s  -GJ        Exercise 4    Exercise Name 4  --  PRONE I'S T'S Y'S   -GJ     Cueing 4  --  Demo  -GJ     Sets 4  --  1  -GJ     Reps 4  --  20  -GJ     Additional Comments  --  2#  -GJ        Exercise 8    Exercise Name 8  --  Lat pull downs (seated)  -GJ     Cueing 8  --  Verbal;Tactile;Demo  -GJ     Reps 8  --  20  -GJ     Additional Comments  --  5 plates  -GJ        Exercise 10    Exercise Name 10  --  ER/IR, standing   -GJ     Cueing 10  --  Verbal;Demo  -GJ     Sets 10  --  2  -GJ     Reps 10  --  10  -GJ     Additional Comments  --  RTB  -GJ        Exercise 11    Exercise Name 11  --  standing HA/D2  -GJ     Cueing 11  --  Verbal;Demo  -GJ     Reps 11  --  10  -GJ     Additional Comments  --  RTB  -GJ        Exercise 12    Exercise Name 12  --  standing ER B  -GJ     Cueing 12  --  Verbal;Demo  -GJ     Reps 12  --  2 x 10  -GJ     Additional Comments  --  RTB  -GJ        Exercise 13    Exercise Name 13  --  SL ER   -GJ     Cueing 13  --  Verbal;Demo  -GJ     Sets 13  --  2  -GJ     Reps 13  --  10  -GJ     Additional Comments  --  2#, towel roll  -GJ       User Key  (r) = Recorded By, (t) = Taken By, (c) = Cosigned By    Initials Name Provider Type    Saad Smith, PT Physical Therapist                       PT OP Goals     Row Name 01/07/21 0600          PT Short Term Goals    STG Date to Achieve  12/23/20  -GJ     STG 1  Patient will be independent with initial HEP.  -GJ     STG 1 Progress  Met  -GJ     STG 2  Patient will comply to activity modification for optimal healing.  -GJ     STG 2 Progress  Met  -GJ     STG 3  Patient will report 25% or greater improvement in sleep per shoulder pain.  -GJ     STG 3 Progress  Met  -GJ        Long Term Goals    LTG Date to Achieve  01/22/21  -GJ     LTG 1  Patient will be independent with progressive HEP for long term management of current condition.  -GJ     LTG 1 Progress  Ongoing  -GJ     LTG 2  Patient will score </= 26% disability  on the quickDASH to indicate improved perceived performance with ADLs.  -GJ     LTG 2 Progress  Ongoing  -GJ     LTG 2 Progress Comments  34, initial score of 36  -GJ     LTG 3  Patient will demonstrate L ER strength 4/5 or greater per MMT to ease object manipulation.  -GJ     LTG 3 Progress  Ongoing  -GJ     LTG 3 Progress Comments  4-/5, painful  -GJ     LTG 4  Patient will resume recreational exercise program for community engagement.  -GJ     LTG 4 Progress  Partially Met  -GJ     LTG 4 Progress Comments  he is performing gym activities,, however with some pain  -GJ       User Key  (r) = Recorded By, (t) = Taken By, (c) = Cosigned By    Initials Name Provider Type    GJ Saad Ace, PT Physical Therapist          Therapy Education  Education Details: reviewed anatomy of scapular girdle, importace of postural awareness, setting scapula, activity modification  Given: HEP, Symptoms/condition management, Pain management, Mobility training, Posture/body mechanics  Program: Reinforced, New, Progressed  How Provided: Verbal, Demonstration  Provided to: Patient  Level of Understanding: Teach back education performed, Verbalized, Demonstrated    Outcome Measure Options: Quick DASH(34)         Time Calculation:   Start Time: 0700  Stop Time: 0744  Time Calculation (min): 44 min  Therapy Charges for Today     Code Description Service Date Service Provider Modifiers Qty    69221057502 HC PT THER PROC EA 15 MIN 1/7/2021 Saad Ace, PT GP 2    [id number]  PT THERAPEUTIC ACT EA 15 MIN 1/7/2021 Saad Ace, PT GP 1          PT G-Codes  Outcome Measure Options: Quick DASH(34)         Saad Ace PT  1/7/2021

## 2021-01-14 PROBLEM — Z12.11 SCREEN FOR COLON CANCER: Status: ACTIVE | Noted: 2021-01-14

## 2021-01-15 DIAGNOSIS — F41.1 GENERALIZED ANXIETY DISORDER: ICD-10-CM

## 2021-01-15 DIAGNOSIS — F41.0 PANIC ATTACKS: ICD-10-CM

## 2021-01-15 RX ORDER — ALPRAZOLAM 0.5 MG/1
TABLET ORAL
Qty: 10 TABLET | Refills: 0 | OUTPATIENT
Start: 2021-01-15

## 2021-01-19 ENCOUNTER — TELEPHONE (OUTPATIENT)
Dept: INTERNAL MEDICINE | Facility: CLINIC | Age: 51
End: 2021-01-19

## 2021-01-19 NOTE — TELEPHONE ENCOUNTER
----- Message from Clarence Hoang sent at 1/19/2021  2:29 PM EST -----  Regarding: Prescription Question  Contact: [phone]  I've grossly understated my level of anxiety to you. I'll make an appointment ASAP. THAT ten pill prescription made a huge difference in my quality of life. Thanks and see you soon as possible.

## 2021-01-19 NOTE — TELEPHONE ENCOUNTER
Pt is wanting to know why you denied his xanax. I have made him a appointment for Thursday if he needs to come back in.

## 2021-01-20 ENCOUNTER — APPOINTMENT (OUTPATIENT)
Dept: PHYSICAL THERAPY | Facility: HOSPITAL | Age: 51
End: 2021-01-20

## 2021-01-20 NOTE — TELEPHONE ENCOUNTER
Pt informed. Pt is going to call psychiatry and schedule an appointment. I have mailed a list to the pt.

## 2021-01-20 NOTE — TELEPHONE ENCOUNTER
Due to his history I gave him a short course of alprazolam , but referred him to psychiatry for further medication management.

## 2021-01-26 ENCOUNTER — HOSPITAL ENCOUNTER (OUTPATIENT)
Dept: PHYSICAL THERAPY | Facility: HOSPITAL | Age: 51
Setting detail: THERAPIES SERIES
Discharge: HOME | End: 2021-01-26

## 2021-01-26 DIAGNOSIS — M25.512 ACUTE PAIN OF LEFT SHOULDER: Primary | ICD-10-CM

## 2021-01-26 PROCEDURE — 97110 THERAPEUTIC EXERCISES: CPT | Performed by: PHYSICAL THERAPIST

## 2021-01-26 NOTE — THERAPY TREATMENT NOTE
Outpatient Physical Therapy Ortho Treatment Note  [facility]     Patient Name: Clarence Hoang  : 1970  MRN: [id number]  Today's Date: 2021      Visit Date: 2021    Visit Dx:    ICD-10-CM ICD-9-CM   1. Acute pain of left shoulder  M25.512 719.41       Patient Active Problem List   Diagnosis   • Hiatal hernia   • Generalized anxiety disorder   • Screen for colon cancer        No past medical history on file.     Past Surgical History:   Procedure Laterality Date   • EYE SURGERY     • HERNIA REPAIR                         PT Assessment/Plan     Row Name 21 0745          PT Assessment    Assessment Comments  Mr. Hoang returns, reporting overall improvement and absence of pinching pain, however continued ache pain which does affect his sleep. He voices interest in holding formal therapy and practicing independent management. I am in agreement with this option.  Mr. Hoang is encouraged to call with questions and to contiue HEP 2-3 x's per week.  Mr. Hoang will practice indepenent management at this time..  -GJ        PT Plan    PT Plan Comments  pt to trial independent management, will call with questions.  -GJ       User Key  (r) = Recorded By, (t) = Taken By, (c) = Cosigned By    Initials Name Provider Type    Saad Smith, PT Physical Therapist            OP Exercises     Row Name 21 0657 21 0600          Subjective Comments    Subjective Comments  --  I'm exhausted, had a hard day. The pain has morphed and changed. I don't have the pinch anymore, I can do a lot I just have pain at night, like a dull ache that keeps me up.   -GJ        Total Minutes    67245 - PT Therapeutic Exercise Minutes  40  -GJ  --        Exercise 1    Exercise Name 1  --  UBE  -GJ     Time 1  --  4 min  -GJ        Exercise 2    Exercise Name 2  --  Doorway pec stretch  -GJ     Cueing 2  --  Demo  -GJ     Reps 2  --  3  -GJ     Time 2  --  20s  -GJ        Exercise 4    Exercise Name 4   --  PRONE I'S T'S Y'S   -GJ     Cueing 4  --  Demo  -GJ     Sets 4  --  1  -GJ     Reps 4  --  20  -GJ     Additional Comments  --  2#  -GJ        Exercise 8    Exercise Name 8  --  Lat pull downs (seated)  -GJ     Cueing 8  --  Verbal;Tactile;Demo  -GJ     Sets 8  --  2  -GJ     Reps 8  --  20  -GJ     Additional Comments  --  5 plates  -GJ        Exercise 10    Exercise Name 10  --  ER/IR, standing   -GJ     Cueing 10  --  Verbal;Demo  -GJ     Sets 10  --  2  -GJ     Reps 10  --  10  -GJ     Additional Comments  --  RTB  -GJ        Exercise 11    Exercise Name 11  --  standing HA/D2  -GJ     Cueing 11  --  Verbal;Demo  -GJ     Reps 11  --  10  -GJ     Additional Comments  --  RTB, in SLS (5 on each foot)  -GJ        Exercise 12    Exercise Name 12  --  standing ER B  -GJ     Cueing 12  --  Verbal;Demo  -GJ     Reps 12  --  2 x 10  -GJ     Additional Comments  --  RTB  -GJ        Exercise 13    Exercise Name 13  --  SL ER   -GJ     Cueing 13  --  Verbal;Demo  -GJ     Sets 13  --  2  -GJ     Reps 13  --  20  -GJ     Additional Comments  --  2#, towel roll  -GJ       User Key  (r) = Recorded By, (t) = Taken By, (c) = Cosigned By    Initials Name Provider Type    Saad Smith, PT Physical Therapist                       PT OP Goals     Row Name 01/26/21 0600          PT Short Term Goals    STG Date to Achieve  12/23/20  -GJ     STG 1  Patient will be independent with initial HEP.  -GJ     STG 1 Progress  Met  -GJ     STG 2  Patient will comply to activity modification for optimal healing.  -GJ     STG 2 Progress  Met  -GJ     STG 3  Patient will report 25% or greater improvement in sleep per shoulder pain.  -GJ     STG 3 Progress  Met  -GJ        Long Term Goals    LTG Date to Achieve  01/22/21  -GJ     LTG 1  Patient will be independent with progressive HEP for long term management of current condition.  -GJ     LTG 1 Progress  Ongoing  -GJ     LTG 2  Patient will score </= 26% disability on the quickDASH to  indicate improved perceived performance with ADLs.  -GJ     LTG 2 Progress  Ongoing  -GJ     LTG 3  Patient will demonstrate L ER strength 4/5 or greater per MMT to ease object manipulation.  -GJ     LTG 3 Progress  Ongoing  -GJ     LTG 4  Patient will resume recreational exercise program for community engagement.  -GJ     LTG 4 Progress  Partially Met  -GJ       User Key  (r) = Recorded By, (t) = Taken By, (c) = Cosigned By    Initials Name Provider Type     Saad Ace, PT Physical Therapist          Therapy Education  Education Details: discussed independent management, discussed options at gym, carrying weight around track, above head, fast/slow motions, enocuraged pt to call with questions.  Given: HEP, Symptoms/condition management, Pain management, Mobility training, Posture/body mechanics  Program: Reinforced, New  How Provided: Verbal  Provided to: Patient  Level of Understanding: Teach back education performed, Verbalized, Demonstrated              Time Calculation:   Start Time: 0700  Stop Time: 0740  Time Calculation (min): 40 min  Therapy Charges for Today     Code Description Service Date Service Provider Modifiers Qty    [id number]  PT THER PROC EA 15 MIN 1/26/2021 Saad Ace, PT GP 3                    Saad Ace, PT  1/26/2021

## 2021-02-17 ENCOUNTER — TRANSCRIBE ORDERS (OUTPATIENT)
Dept: SLEEP MEDICINE | Facility: HOSPITAL | Age: 51
End: 2021-02-17

## 2021-02-17 DIAGNOSIS — Z01.818 OTHER SPECIFIED PRE-OPERATIVE EXAMINATION: Primary | ICD-10-CM

## 2021-02-22 ENCOUNTER — LAB (OUTPATIENT)
Dept: LAB | Facility: HOSPITAL | Age: 51
End: 2021-02-22

## 2021-02-22 DIAGNOSIS — Z01.818 OTHER SPECIFIED PRE-OPERATIVE EXAMINATION: ICD-10-CM

## 2021-02-22 PROCEDURE — U0004 COV-19 TEST NON-CDC HGH THRU: HCPCS

## 2021-02-22 PROCEDURE — C9803 HOPD COVID-19 SPEC COLLECT: HCPCS

## 2021-02-23 LAB — SARS-COV-2 ORF1AB RESP QL NAA+PROBE: NOT DETECTED

## 2021-02-24 ENCOUNTER — HOSPITAL ENCOUNTER (OUTPATIENT)
Facility: HOSPITAL | Age: 51
Setting detail: HOSPITAL OUTPATIENT SURGERY
Discharge: HOME | End: 2021-02-24
Attending: SURGERY | Admitting: SURGERY

## 2021-02-24 ENCOUNTER — ANESTHESIA EVENT (OUTPATIENT)
Dept: GASTROENTEROLOGY | Facility: HOSPITAL | Age: 51
End: 2021-02-24

## 2021-02-24 ENCOUNTER — ANESTHESIA (OUTPATIENT)
Dept: GASTROENTEROLOGY | Facility: HOSPITAL | Age: 51
End: 2021-02-24

## 2021-02-24 VITALS
BODY MASS INDEX: 28.45 KG/M2 | DIASTOLIC BLOOD PRESSURE: 67 MMHG | SYSTOLIC BLOOD PRESSURE: 110 MMHG | WEIGHT: 198.3 LBS | OXYGEN SATURATION: 98 % | RESPIRATION RATE: 16 BRPM | HEART RATE: 66 BPM

## 2021-02-24 PROCEDURE — 45378 DIAGNOSTIC COLONOSCOPY: CPT | Performed by: SURGERY

## 2021-02-24 PROCEDURE — 25010000002 GLUCAGON (RDNA) PER 1 MG: Performed by: SURGERY

## 2021-02-24 PROCEDURE — S0260 H&P FOR SURGERY: HCPCS | Performed by: SURGERY

## 2021-02-24 PROCEDURE — 25010000002 PROPOFOL 10 MG/ML EMULSION: Performed by: ANESTHESIOLOGY

## 2021-02-24 RX ORDER — LIDOCAINE HYDROCHLORIDE 20 MG/ML
INJECTION, SOLUTION INFILTRATION; PERINEURAL AS NEEDED
Status: DISCONTINUED | OUTPATIENT
Start: 2021-02-24 | End: 2021-02-24 | Stop reason: SURG

## 2021-02-24 RX ORDER — PROPOFOL 10 MG/ML
VIAL (ML) INTRAVENOUS CONTINUOUS PRN
Status: DISCONTINUED | OUTPATIENT
Start: 2021-02-24 | End: 2021-02-24 | Stop reason: SURG

## 2021-02-24 RX ORDER — TRAZODONE HYDROCHLORIDE 100 MG/1
100 TABLET ORAL NIGHTLY
COMMUNITY
End: 2022-08-15 | Stop reason: SDUPTHER

## 2021-02-24 RX ORDER — SODIUM CHLORIDE, SODIUM LACTATE, POTASSIUM CHLORIDE, CALCIUM CHLORIDE 600; 310; 30; 20 MG/100ML; MG/100ML; MG/100ML; MG/100ML
1000 INJECTION, SOLUTION INTRAVENOUS CONTINUOUS
Status: DISCONTINUED | OUTPATIENT
Start: 2021-02-24 | End: 2021-02-24 | Stop reason: HOSPADM

## 2021-02-24 RX ORDER — GLYCOPYRROLATE 0.2 MG/ML
INJECTION INTRAMUSCULAR; INTRAVENOUS AS NEEDED
Status: DISCONTINUED | OUTPATIENT
Start: 2021-02-24 | End: 2021-02-24 | Stop reason: SURG

## 2021-02-24 RX ORDER — PROPOFOL 10 MG/ML
VIAL (ML) INTRAVENOUS AS NEEDED
Status: DISCONTINUED | OUTPATIENT
Start: 2021-02-24 | End: 2021-02-24 | Stop reason: SURG

## 2021-02-24 RX ADMIN — PROPOFOL 160 MCG/KG/MIN: 10 INJECTION, EMULSION INTRAVENOUS at 08:01

## 2021-02-24 RX ADMIN — LIDOCAINE HYDROCHLORIDE 40 MG: 20 INJECTION, SOLUTION INFILTRATION; PERINEURAL at 07:59

## 2021-02-24 RX ADMIN — GLYCOPYRROLATE 0.2 MG: 0.2 INJECTION INTRAMUSCULAR; INTRAVENOUS at 07:59

## 2021-02-24 RX ADMIN — SODIUM CHLORIDE, POTASSIUM CHLORIDE, SODIUM LACTATE AND CALCIUM CHLORIDE 1000 ML: 600; 310; 30; 20 INJECTION, SOLUTION INTRAVENOUS at 06:59

## 2021-02-24 RX ADMIN — PROPOFOL 150 MG: 10 INJECTION, EMULSION INTRAVENOUS at 08:01

## 2021-02-24 RX ADMIN — SODIUM CHLORIDE, POTASSIUM CHLORIDE, SODIUM LACTATE AND CALCIUM CHLORIDE: 600; 310; 30; 20 INJECTION, SOLUTION INTRAVENOUS at 07:57

## 2021-02-24 NOTE — ANESTHESIA POSTPROCEDURE EVALUATION
Patient: Clarence Hoang    Procedure Summary     Date: 02/24/21 [facility] / [facility]    Anesthesia Start: 0757 Anesthesia Stop: 0820    Procedure: COLONOSCOPY  TO CECUM (N/A ) Diagnosis:       Screen for colon cancer      (Screen for colon cancer [Z12.11])    Surgeon: Abhi Malik MD Provider: Jani Black MD    Anesthesia Type: MAC ASA Status: 2          Anesthesia Type: MAC    Vitals  No vitals data found for the desired time range.          Post Anesthesia Care and Evaluation    Patient location during evaluation: bedside  Patient participation: complete - patient participated  Level of consciousness: responsive to light touch, responsive to verbal stimuli and sleepy but conscious  Pain score: 0  Pain management: adequate  Airway patency: patent  Anesthetic complications: No anesthetic complications  PONV Status: none  Cardiovascular status: acceptable and hemodynamically stable  Respiratory status: acceptable  Hydration status: acceptable

## 2021-03-11 ENCOUNTER — TELEPHONE (OUTPATIENT)
Dept: INTERNAL MEDICINE | Facility: CLINIC | Age: 51
End: 2021-03-11

## 2021-03-11 RX ORDER — FAMOTIDINE 20 MG/1
20 TABLET, FILM COATED ORAL 2 TIMES DAILY
Qty: 60 TABLET | Refills: 1 | Status: SHIPPED | OUTPATIENT
Start: 2021-03-11 | End: 2021-05-10

## 2021-03-11 NOTE — TELEPHONE ENCOUNTER
Caller: Clarence Hoang    Relationship: Self    Best call back number:  [phone]     Medication needed:   Requested Prescriptions     Pending Prescriptions Disp Refills   • famotidine (PEPCID) 20 MG tablet         When do you need the refill by: ASAP   What details did the patient provide when requesting the medication: out of medication , has appointment 3/12/2021 @ 8:00 am     Does the patient have less than a 3 day supply:  [x] Yes  [] No    What is the patient's preferred pharmacy: [facility] [address] [place] [address] [address] AT [facility] [address]  - [phone] [facility] [phone] FX

## 2021-03-12 ENCOUNTER — OFFICE VISIT (OUTPATIENT)
Dept: INTERNAL MEDICINE | Facility: CLINIC | Age: 51
End: 2021-03-12

## 2021-03-12 VITALS
DIASTOLIC BLOOD PRESSURE: 70 MMHG | HEART RATE: 57 BPM | OXYGEN SATURATION: 98 % | SYSTOLIC BLOOD PRESSURE: 122 MMHG | RESPIRATION RATE: 16 BRPM | WEIGHT: 202 LBS | BODY MASS INDEX: 28.92 KG/M2 | HEIGHT: 70 IN | TEMPERATURE: 97.7 F

## 2021-03-12 DIAGNOSIS — K21.9 GASTROESOPHAGEAL REFLUX DISEASE WITHOUT ESOPHAGITIS: ICD-10-CM

## 2021-03-12 DIAGNOSIS — M79.18 GLUTEAL PAIN: Primary | ICD-10-CM

## 2021-03-12 DIAGNOSIS — K44.9 HIATAL HERNIA: ICD-10-CM

## 2021-03-12 DIAGNOSIS — M25.552 LEFT HIP PAIN: ICD-10-CM

## 2021-03-12 PROBLEM — Z12.11 SCREEN FOR COLON CANCER: Status: RESOLVED | Noted: 2021-01-14 | Resolved: 2021-03-12

## 2021-03-12 PROCEDURE — 99213 OFFICE O/P EST LOW 20 MIN: CPT | Performed by: NURSE PRACTITIONER

## 2021-03-12 RX ORDER — CYCLOBENZAPRINE HCL 10 MG
10 TABLET ORAL 3 TIMES DAILY PRN
Qty: 30 TABLET | Refills: 0 | Status: SHIPPED | OUTPATIENT
Start: 2021-03-12 | End: 2021-05-05

## 2021-03-12 NOTE — PROGRESS NOTES
Subjective   Clarence Hoang is a 51 y.o. male. Patient is here today for   Chief Complaint   Patient presents with   • Heartburn     med refill    • Hip Pain   Answers for HPI/ROS submitted by the patient on 3/12/2021  What is the primary reason for your visit?: Other  Please describe your symptoms.: Dr requested visit  Have you had these symptoms before?: No  How long have you been having these symptoms?: 1-4 days           Vitals:    21 0754   BP: 122/70   Pulse: 57   Resp: 16   Temp: 97.7 °F (36.5 °C)   SpO2: 98%     Body mass index is 28.98 kg/m².  The following portions of the patient's history were reviewed and updated as appropriate: allergies, current medications, past family history, past medical history, past social history, past surgical history and problem list.    Past Medical History:   Diagnosis Date   • Anxiety    • GERD (gastroesophageal reflux disease)    • Overactive bladder       No Known Allergies   Social History     Socioeconomic History   • Marital status: Single     Spouse name: Not on file   • Number of children: Not on file   • Years of education: Not on file   • Highest education level: Not on file   Tobacco Use   • Smoking status: Former Smoker     Packs/day: 2.00     Years: 30.00     Pack years: 60.00     Types: Cigarettes     Quit date: 2017     Years since quittin.1   • Smokeless tobacco: Current User   • Tobacco comment: vaping    Vaping Use   • Vaping Use: Every day   • Substances: Nicotine, Flavoring   • Devices: Pre-filled or refillable cartridge   Substance and Sexual Activity   • Alcohol use: Not Currently   • Drug use: Never   • Sexual activity: Not Currently        Current Outpatient Medications:   •  ALPRAZolam (XANAX) 0.5 MG tablet, Take 1 tablet by mouth 2 (Two) Times a Day As Needed for Anxiety., Disp: 10 tablet, Rfl: 0  •  famotidine (PEPCID) 20 MG tablet, Take 1 tablet by mouth 2 (Two) Times a Day., Disp: 60 tablet, Rfl: 1  •  mirtazapine (REMERON) 15  MG tablet, , Disp: , Rfl:   •  pantoprazole (PROTONIX) 40 MG EC tablet, , Disp: , Rfl:   •  tadalafil (CIALIS) 5 MG tablet, , Disp: , Rfl:   •  traZODone (DESYREL) 100 MG tablet, Take 100 mg by mouth Every Night., Disp: , Rfl:   •  cyclobenzaprine (FLEXERIL) 10 MG tablet, Take 1 tablet by mouth 3 (Three) Times a Day As Needed for Muscle Spasms., Disp: 30 tablet, Rfl: 0     Objective     History of Present Illness  Norberto is a 51 year old male patient who is here for medication refills and to discuss hip pain. He is seeing PT for left shoulder pain which has improved greatly. He has had some left hip/gluteal pain for the last few weeks. He denies any known injury. It is worse with sitting. He does not radiate. He is requesting another referral for PT for this. It is worse at night as well and he would like a muscle relaxer to take as needed   He has a hiatal hernia and has GERD symptoms were are well controlled on famotidine  Review of Systems   Constitutional: Negative for fatigue.   Respiratory: Negative.    Cardiovascular: Negative.    Gastrointestinal:        GERD secondary to hiatal hernia,    Musculoskeletal:        Left hip/gluteal pain    Psychiatric/Behavioral: Positive for sleep disturbance. The patient is nervous/anxious.         Is seeing psychiatry        Physical Exam  Vitals and nursing note reviewed.   Constitutional:       General: He is awake. He is not in acute distress.     Appearance: Normal appearance. He is well-developed and well-groomed.   HENT:      Head: Normocephalic.   Cardiovascular:      Rate and Rhythm: Normal rate.   Pulmonary:      Effort: Pulmonary effort is normal.   Musculoskeletal:      Left hip: No deformity or tenderness. Normal range of motion.        Legs:    Neurological:      Mental Status: He is alert.         ASSESSMENT     Problems Addressed this Visit     Hiatal hernia      Other Visit Diagnoses     Gluteal pain    -  Primary    Relevant Medications    cyclobenzaprine  (FLEXERIL) 10 MG tablet    Other Relevant Orders    Ambulatory Referral to Physical Therapy Evaluate and treat    Left hip pain        Relevant Orders    Ambulatory Referral to Physical Therapy Evaluate and treat    Gastroesophageal reflux disease without esophagitis          Diagnoses       Codes Comments    Gluteal pain    -  Primary ICD-10-CM: M79.18  ICD-9-CM: 729.1     Left hip pain     ICD-10-CM: M25.552  ICD-9-CM: 719.45     Hiatal hernia     ICD-10-CM: K44.9  ICD-9-CM: 553.3     Gastroesophageal reflux disease without esophagitis     ICD-10-CM: K21.9  ICD-9-CM: 530.81           PLAN  Continue famotidine   Will refer to PT per patient request  Apply heat to left hip/gluteal area  Can take flexeril as needed, discussed side effects, do not drive while taking it , do not drink ETOH while taking it, avoid taking it with alprazolam and trazodone  Follow up in December 2021 for CPE with labs or sooner If needed

## 2021-03-23 ENCOUNTER — HOSPITAL ENCOUNTER (OUTPATIENT)
Dept: PHYSICAL THERAPY | Facility: HOSPITAL | Age: 51
Setting detail: THERAPIES SERIES
Discharge: HOME | End: 2021-03-23

## 2021-03-23 DIAGNOSIS — M54.50 LEFT LOW BACK PAIN, UNSPECIFIED CHRONICITY, UNSPECIFIED WHETHER SCIATICA PRESENT: ICD-10-CM

## 2021-03-23 DIAGNOSIS — M25.559 HIP PAIN: Primary | ICD-10-CM

## 2021-03-23 PROCEDURE — 97161 PT EVAL LOW COMPLEX 20 MIN: CPT | Performed by: PHYSICAL THERAPIST

## 2021-03-24 ENCOUNTER — BULK ORDERING (OUTPATIENT)
Dept: CASE MANAGEMENT | Facility: OTHER | Age: 51
End: 2021-03-24

## 2021-03-24 DIAGNOSIS — Z23 IMMUNIZATION DUE: ICD-10-CM

## 2021-03-24 NOTE — THERAPY EVALUATION
Outpatient Physical Therapy Ortho Initial Evaluation  [facility]     Patient Name: Clarence Hoang  : 1970  MRN: [id number]  Today's Date: 3/24/2021      Visit Date: 2021    Patient Active Problem List   Diagnosis   • Hiatal hernia   • Generalized anxiety disorder        Past Medical History:   Diagnosis Date   • Anxiety    • GERD (gastroesophageal reflux disease)    • Overactive bladder         Past Surgical History:   Procedure Laterality Date   • CATARACT EXTRACTION     • COLONOSCOPY N/A 2021    Procedure: COLONOSCOPY  TO CECUM;  Surgeon: Abhi Malik MD;  Location: [facility] ENDOSCOPY;  Service: General;  Laterality: N/A;  SCREENING  --DIVERTICULOSIS    • HERNIA REPAIR         Visit Dx:     ICD-10-CM ICD-9-CM   1. Hip pain  M25.559 719.45   2. Left low back pain, unspecified chronicity, unspecified whether sciatica present  M54.5 724.2         Patient History     Row Name 21 0800 21 0748          History    Chief Complaint  Pain  -GJ  Burn  (Pended)   (Patient-Rptd)   -patient     Type of Pain  Back pain;Hip pain L  -GJ  Shoulder pain  (Pended)   (Patient-Rptd)   -patient     Date Current Problem(s) Began  -- 2 years ago  -GJ  --     Brief Description of Current Complaint  Mr. Hoang is a 50 y/o male. He presents to the clinic with c/p of L posterior hip girdle pain, near ischial tuberosity, feels like he is sitting on a wad.  He reported having drop foot on L 2 years ago, resolved with dry needling. He reports feeling bound up, is not good at stretching, lifts weights regularly.  Denies changes in bowel/bladder, denies N/T. It is uncomfortable to walk. Riding in car or sitting >/= 20 min. is very uncomfortable.  He has not had imaging or recent treatment. He is going to [place] in one month and driving and wants to help this condition prior to his trip.   -GJ  --     Previous treatment for THIS PROBLEM  Chiropractor;Rehabilitation  -GJ  --     Patient/Caregiver Goals   Relieve pain;Know what to do to help the symptoms  -GJ  Relieve pain  (Pended)   (Patient-Rptd)   -patient     Hand Dominance  left-handed  -GJ  left-handed  (Pended)   (Patient-Rptd)   -patient     Occupation/sports/leisure activities  restuarant, exercise  -GJ  Lifting  (Pended)   (Patient-Rptd)   -patient     Patient seeing anyone else for problem(s)?  --  No  (Pended)   (Patient-Rptd)   -patient     What clinical tests have you had for this problem?  -- nothing  -GJ  --        Pain     Pain Location  Hip  -GJ  --     What Performance Factors Make the Current Problem(s) WORSE?  sitting >/= 20 min., riding in car, uncomfortable to walk   -GJ  --     What Performance Factors Make the Current Problem(s) BETTER?  stretch  -GJ  --        Fall Risk Assessment    Any falls in the past year:  No  -GJ  No  (Pended)   (Patient-Rptd)   -patient        Services    Prior Rehab/Home Health Experiences  --  No  (Pended)   (Patient-Rptd)   -patient     Are you currently receiving Home Health services  --  No  (Pended)   (Patient-Rptd)   -patient     Do you plan to receive Home Health services in the near future  --  No  (Pended)   (Patient-Rptd)   -patient        Daily Activities    Primary Language  English  -GJ  English  (Pended)   (Patient-Rptd)   -patient     How does patient learn best?  Listening  -GJ  Listening  (Pended)   (Patient-Rptd)   -patient     Patient is concerned about/has problems with  Sitting;Performing home management (household chores, shopping, care of dependents);Performing job responsibilities/community activities (work, school,;Performing sports, recreation, and play activities  -GJ  --     Barriers to learning  None  -GJ  --     Pt Participated in POC and Goals  Yes  -GJ  --        Safety    Are you being hurt, hit, or frightened by anyone at home or in your life?  No  -GJ  No  (Pended)   (Patient-Rptd)   -patient     Are you being neglected by a caregiver  No  -GJ  No  (Pended)   (Patient-Rptd)    -patient     Have you had any of the following issues with  --  Anxiety  (Pended)   (Patient-Rptd)   -patient       User Key  (r) = Recorded By, (t) = Taken By, (c) = Cosigned By    Initials Name Provider Type    Saad Smith, PT Physical Therapist    patient Clarence Hoang --          PT Ortho     Row Name 03/23/21 0900       Posture/Observations    Posture/Observations Comments  mild forward flexed posture  -GJ       Quarter Clearing    Quarter Clearing  Lower Quarter Clearing  -GJ       DTR- Lower Quarter Clearing    Patellar tendon (L2-4)  Bilateral:;0- No response  -GJ    Achilles tendon (S1-2)  Bilateral:;0- No response  -GJ       Neural Tension Signs- Lower Quarter Clearing    Slump  Bilateral:;Negative  -GJ    SLR  Bilateral:;Negative  -GJ    Prone knee flexion  Bilateral:;Negative  -GJ       Myotomal Screen- Lower Quarter Clearing    Hip flexion (L2)  Bilateral:;4 (Good)  -GJ    Knee extension (L3)  Bilateral:;5 (Normal)  -GJ    Ankle DF (L4)  Bilateral:;5 (Normal)  -GJ    Ankle PF (S1)  Bilateral:;4+ (Good +)  -GJ    Knee flexion (S2)  Bilateral:;5 (Normal)  -GJ       Lumbar ROM Screen- Lower Quarter Clearing    Lumbar Flexion  Impaired impaired by 50%, mild R deviation  -GJ    Lumbar Extension  Normal  -GJ    Lumbar Rotation  Normal  -GJ       SI/Hip Screen- Lower Quarter Clearing    Pamela's/Alan's test  Bilateral:;Negative  -GJ    Posterior thigh sheer  Bilateral:;Negative  -GJ       Special Tests/Palpation    Special Tests/Palpation  Lumbar/SI;Hip  -GJ       Lumbar/SI Special Tests    Thigh Thrust/Posterior Shear (SI Dysfunction)  Bilateral:;Negative  -GJ    Sacral Spring Test (SI Dysfunction)  Negative  -GJ       Lumbosacral Palpation    Lumbosacral Palpation?  Yes  -GJ    Piriformis  Left:;Tender;Guarded/taut;Trigger point  -GJ    Quadratus Lumborum  Left:;Guarded/taut  -GJ    Erector Spinae (Paraspinals)  Bilateral:;Guarded/taut  -GJ    Ischial Tuberosity  Left:;Tender;Guarded/taut  -GJ     Hamstring  Left:;Tender  -GJ       Hip/Thigh Palpation    Hip/Thigh Palpation?  Yes  -GJ    Gluteus Medius  Left:;Tender;Guarded/taut;Trigger point  -GJ       Hip Special Tests    Trendelenberg sign (gluteus medius weakness)  Bilateral:;Negative  -GJ    Ely’s test (rectus femoris tightness)  Bilateral:;Negative  -GJ    Hip scour test (labral vs hip pathology)  Bilateral:;Negative  -GJ       General ROM    GENERAL ROM COMMENTS  bilateral hip PROM grossly equal and WFL  -GJ       MMT (Manual Muscle Testing)    Rt Lower Ext  Rt Hip Flexion;Rt Hip ABduction;Rt Hip Extension  -GJ    Lt Lower Ext  Lt Hip Flexion;Lt Hip Extension;Lt Hip ABduction;Lt Knee Flexion  -GJ       MMT Right Lower Ext    Rt Hip Extension MMT, Gross Movement  (4+/5) good plus  -GJ    Rt Hip ABduction MMT, Gross Movement  (4+/5) good plus  -GJ       MMT Left Lower Ext    Lt Hip Extension MMT, Gross Movement  (4+/5) good plus  -GJ    Lt Hip ABduction MMT, Gross Movement  (4+/5) good plus  -GJ    Lt Knee Flexion MMT, Gross Movement  (4+/5) good plus no pain, tested in sitting, in prone in multiple planes  -GJ       Flexibility    Flexibility Tested?  Lower Extremity  -GJ       Lower Extremity Flexibility    Hamstrings  Bilateral:;Moderately limited  -GJ    Hip Flexors  Bilateral:;Moderately limited  -GJ    Hip External Rotators  Bilateral:;Moderately limited  -GJ    Hip Internal Rotators  Bilateral:;Moderately limited  -GJ    Gastrocnemius  Bilateral:;Mildly limited;Moderately limited  -GJ      User Key  (r) = Recorded By, (t) = Taken By, (c) = Cosigned By    Initials Name Provider Type    Saad Smith, PT Physical Therapist                      Therapy Education  Education Details: [id number], discussed dx, px, poc, discussed anatomy of the spine and physiology of healing, discussed realistic expectations and time frames for therapy. Discussed acctivity modifications,importance of adherence to HEP/stretches for ultimate carry over of  condition  Given: HEP, Symptoms/condition management, Pain management, Mobility training, Posture/body mechanics  Program: New  How Provided: Verbal, Demonstration, Written  Provided to: Patient  Level of Understanding: Teach back education performed, Verbalized, Demonstrated     PT OP Goals     Row Name 03/23/21 0800          PT Short Term Goals    STG Date to Achieve  04/23/21  -GJ     STG 1  pt. to be I with initial HEP to facilitate self management of their condition  -GJ     STG 1 Progress  New  -GJ     STG 2  pt. to be educated in/verbalize understanding of the importance of posture/ergonomics in association with their condition to facilitate self management of their condition  -GJ     STG 2 Progress  New  -GJ     STG 3  pt to report ability to sit > 3 min without L posterior hip pain to facilitat ease of traveling by car  -GJ     STG 3 Progress  New  -GJ        Long Term Goals    LTG Date to Achieve  06/25/21  -GJ     LTG 1  pt. to be I with advanced HEP to facilitate self management of their condition  -GJ     LTG 1 Progress  New  -GJ     LTG 2  pt to report ability to sit > 1 hour without L posterior hip pain to facilitat ease of traveling by car  -GJ     LTG 2 Progress  New  -GJ     LTG 3  pt to report >/= 50% improvement in his L posterior hip pain to facilitate ease of performing work/recreationall activities  -GJ     LTG 3 Progress  New  -GJ     LTG 4 Progress  --  -GJ        Time Calculation    PT Goal Re-Cert Due Date  06/25/21  -GJ       User Key  (r) = Recorded By, (t) = Taken By, (c) = Cosigned By    Initials Name Provider Type    GJ Saad Ace, PT Physical Therapist          PT Assessment/Plan     Row Name 03/23/21 0830          PT Assessment    Functional Limitations  Impaired gait;Limitations in community activities;Performance in leisure activities;Performance in work activities  -GJ     Impairments  Gait;Impaired flexibility;Impaired muscle length;Impaired postural alignment;Muscle  strength;Pain;Poor body mechanics;Posture;Range of motion  -GJ     Assessment Comments  Mr. Hoang is a 52 y/o male. He presents to the clinic with c/p of L posterior hip girdle pain, near ischial tuberosity, feels like he is sitting on a wad.  He reported having drop foot on L 2 years ago, resolved with dry needling. He reports feeling bound up, is not good at stretching, lifts weights regularly.  Denies changes in bowel/bladder, denies N/T. It is uncomfortable to walk. Riding in car or sitting >/= 20 min. is very uncomfortable.  He has not had imaging or recent treatment. He is going to [place] in one month and driving and wants to help this condition prior to his trip.  Mr. Hoang presents to the clinic with near normal to mild alterations in gait. He is very inflexible throughout lower quarter. Bilateral lower quarter myotomes are strong and equal. He demonstrates some limitations in L spine AROM, (-) neural testing of lower quarter. He demonstrates (+) trigger points along L posterior hip girdle tissues. Mr. Hoang demonstrates stable s/s consistent with trigger point syndrome/over use which limits his participation in leisure, sitting/traveling, work, fitness ativities.    Aggravating/Personal factors affecting recovery include,  but are not limited to, chronicity of condition.  Mr. Hoang may benefit from skilled physical therapy to address the above impairments.  -GJ     Please refer to paper survey for additional self-reported information  Yes  -GJ     Rehab Potential  Good  -GJ     Patient/caregiver participated in establishment of treatment plan and goals  Yes  -GJ     Patient would benefit from skilled therapy intervention  Yes  -GJ        PT Plan    PT Frequency  2x/week  -GJ     Predicted Duration of Therapy Intervention (PT)  10 visitis  -GJ     Planned CPT's?  PT EVAL LOW COMPLEXITY: 49864;PT RE-EVAL: 41334;PT THER PROC EA 15 MIN: 71553;PT THER ACT EA 15 MIN: 49649;PT MANUAL THERAPY EA 15 MIN:  36449;PT NEUROMUSC RE-EDUCATION EA 15 MIN: 95407;PT AQUATIC THERAPY EA 15 MIN: 93798;PT HOT OR COLD PACK TREAT MCARE;PT ELECTRICAL STIM UNATTEND: ;PT ULTRASOUND EA 15 MIN: 23406  -GJ     PT Plan Comments  assess response to DDN, work on stretching HS/hip rotator tissues, manual vs DDN next session,, SL clam?  -GJ       User Key  (r) = Recorded By, (t) = Taken By, (c) = Cosigned By    Initials Name Provider Type    Saad Smith, PT Physical Therapist            OP Exercises     Row Name 03/23/21 0900             Exercise 1    Exercise Name 1  piriformis stretch  -GJ      Cueing 1  Verbal;Demo  -GJ      Reps 1  3  -GJ      Time 1  20 s  -GJ      Additional Comments  supine/seated  -GJ         Exercise 2    Exercise Name 2  seated HS stretch  -GJ      Cueing 2  Verbal;Demo  -GJ      Reps 2  3  -GJ      Time 2  20 s  -GJ         Exercise 3    Exercise Name 3  attempted lateral prayer stretch, unable secondaary to shoulder  -GJ        User Key  (r) = Recorded By, (t) = Taken By, (c) = Cosigned By    Initials Name Provider Type    Saad Smith, PT Physical Therapist                                  Time Calculation:     Start Time: 0830  Stop Time: 0900  Time Calculation (min): 30 min     Therapy Charges for Today     Code Description Service Date Service Provider Modifiers Qty    92589058486 HC PT EVAL LOW COMPLEXITY 2 3/23/2021 Saad Ace, PT GP 1                    Saad Ace, PT  3/24/2021

## 2021-03-24 NOTE — THERAPY TREATMENT NOTE
Outpatient Physical Therapy Ortho Treatment Note  [facility]     Patient Name: Clarence Hoang  : 1970  MRN: [id number]  Today's Date: 3/24/2021      Visit Date: 2021    Visit Dx:    ICD-10-CM ICD-9-CM   1. Hip pain  M25.559 719.45   2. Left low back pain, unspecified chronicity, unspecified whether sciatica present  M54.5 724.2       Patient Active Problem List   Diagnosis   • Hiatal hernia   • Generalized anxiety disorder        Past Medical History:   Diagnosis Date   • Anxiety    • GERD (gastroesophageal reflux disease)    • Overactive bladder         Past Surgical History:   Procedure Laterality Date   • CATARACT EXTRACTION     • COLONOSCOPY N/A 2021    Procedure: COLONOSCOPY  TO CECUM;  Surgeon: Abhi Malik MD;  Location: [facility] ENDOSCOPY;  Service: General;  Laterality: N/A;  SCREENING  --DIVERTICULOSIS    • HERNIA REPAIR                         PT Assessment/Plan     Row Name 21 0900          PT Assessment    Assessment Comments  Mr. Hoang wished to proceed with DDN of L posterior hip girdle tissue. He demoonstrated several moderate LTR's.  No immediate adaverse response.  -       User Key  (r) = Recorded By, (t) = Taken By, (c) = Cosigned By    Initials Name Provider Type    Saad Smith, PT Physical Therapist                            Manual Rx (last 36 hours)      Manual Treatments     Row Name 21 0900             Manual Rx 1    Manual Rx 1 Location  intramuscular manual therapy  -GJ Assessed pt. for Dry Needling and intramuscular manual therapy. Discussed risks/benefits of Dry Needling with pt, including but not limited to muscle soreness, bruising, vasovagal response, pneumothorax, nerve injury. Patient signed written consent to proceed with treatment.    Patient position during treatment: R Sl with pillow between knees  Muscles treated: L posterior hip girdle tissue.   Response to treatment: several moderate LTR's. No immediate adverse response.      palpation used before, during, and after to facilitate assessment Clean needle technique observed at all times, precautions for lung fields, neurovascular structures observed.    DDN performed by Saad Ace II, PT, DPT     Manual Rx 1 Type  L posterior hip girdle tissues  -GJ        User Key  (r) = Recorded By, (t) = Taken By, (c) = Cosigned By    Initials Name Provider Type    Saad Smith, PT Physical Therapist                             Time Calculation:   Start Time: 0900  Stop Time: 0915  Time Calculation (min): 15 min  Therapy Charges for Today     Code Description Service Date Service Provider Modifiers Qty    21821151978  PT SELF PAY DRY NEEDLING SESSION 3/23/2021 Saad Ace, PT  1                    Saad Ace, PT  3/24/2021

## 2021-03-30 ENCOUNTER — HOSPITAL ENCOUNTER (OUTPATIENT)
Dept: PHYSICAL THERAPY | Facility: HOSPITAL | Age: 51
Setting detail: THERAPIES SERIES
Discharge: HOME | End: 2021-03-30

## 2021-03-30 DIAGNOSIS — M54.50 LEFT LOW BACK PAIN, UNSPECIFIED CHRONICITY, UNSPECIFIED WHETHER SCIATICA PRESENT: ICD-10-CM

## 2021-03-30 DIAGNOSIS — M25.559 HIP PAIN: Primary | ICD-10-CM

## 2021-03-30 NOTE — THERAPY TREATMENT NOTE
Outpatient Physical Therapy Ortho Treatment Note  [facility]     Patient Name: Clarence Hoang  : 1970  MRN: [id number]  Today's Date: 3/30/2021      Visit Date: 2021    Visit Dx:    ICD-10-CM ICD-9-CM   1. Hip pain  M25.559 719.45   2. Left low back pain, unspecified chronicity, unspecified whether sciatica present  M54.5 724.2       Patient Active Problem List   Diagnosis   • Hiatal hernia   • Generalized anxiety disorder        Past Medical History:   Diagnosis Date   • Anxiety    • GERD (gastroesophageal reflux disease)    • Overactive bladder         Past Surgical History:   Procedure Laterality Date   • CATARACT EXTRACTION     • COLONOSCOPY N/A 2021    Procedure: COLONOSCOPY  TO CECUM;  Surgeon: Abhi Malik MD;  Location: [facility] ENDOSCOPY;  Service: General;  Laterality: N/A;  SCREENING  --DIVERTICULOSIS    • HERNIA REPAIR                         PT Assessment/Plan     Row Name 21 09          PT Assessment    Assessment Comments  Mr. Hoang returns reporting good results post dry needling session last visit, however continues to have difficulty sitting for prolonged amount of itme (ie car rides). We continued with DDN of L posterior hip girdle tissue, with a more inferior focus today, resulting in many moderate LTR's. No immediate adverse response.  At this time, Mr. Hoang will attempt self management of his condition, (has a trip to [place] coming up). He will call if he needs to return. Updated HEP.  -GJ        PT Plan    PT Plan Comments  pt to attempt self management, will call if needs to return. If he does, asses response to DDN of L posterior/inferior glut tissues  -GJ       User Key  (r) = Recorded By, (t) = Taken By, (c) = Cosigned By    Initials Name Provider Type    Sada Smith, PT Physical Therapist            OP Exercises     Row Name 21 0900             Subjective Comments    Subjective Comments  The needling really seemed to help, lets go  lower this time. I've been working on the stretching   -GJ         Exercise 4    Exercise Name 4  standing hip flexor stretch  -GJ      Cueing 4  Verbal;Demo  -GJ      Reps 4  3  -GJ      Time 4  20 s  -GJ      Additional Comments  mainatian pelvic tilt  -GJ        User Key  (r) = Recorded By, (t) = Taken By, (c) = Cosigned By    Initials Name Provider Type    Saad Smith, PT Physical Therapist                      Manual Rx (last 36 hours)      Manual Treatments     Row Name 03/30/21 0900             Manual Rx 1    Manual Rx 1 Location  intramuscular manual therapy  -GJ Assessed pt. for Dry Needling and intramuscular manual therapy. Discussed risks/benefits of Dry Needling with pt, including but not limited to muscle soreness, bruising, vasovagal response, pneumothorax, nerve injury. Patient signed written consent to proceed with treatment.    Patient position during treatment: R SL with pillow between knees.   Muscles treated:  L posterior/inferior hip girdle tissues.    Response to treatment: multiple moderate LTR's. No immediate adverse response.     palpation used before, during, and after to facilitate assessment Clean needle technique observed at all times, precautions for lung fields, neurovascular structures observed.    DDN performed by Saad Ace II, PT, DPT     Manual Rx 1 Type  L posterior/inferior hip girdle tissues  -GJ        User Key  (r) = Recorded By, (t) = Taken By, (c) = Cosigned By    Initials Name Provider Type    Saad Smith, PT Physical Therapist          PT OP Goals     Row Name 03/30/21 0900          PT Short Term Goals    STG Date to Achieve  04/23/21  -GJ     STG 1  pt. to be I with initial HEP to facilitate self management of their condition  -GJ     STG 1 Progress  Ongoing  -GJ     STG 2  pt. to be educated in/verbalize understanding of the importance of posture/ergonomics in association with their condition to facilitate self management of their condition  -GJ      STG 2 Progress  Ongoing  -GJ     STG 3  pt to report ability to sit > 3 min without L posterior hip pain to facilitat ease of traveling by car  -GJ     STG 3 Progress  Ongoing  -GJ        Long Term Goals    LTG Date to Achieve  06/25/21  -GJ     LTG 1  pt. to be I with advanced HEP to facilitate self management of their condition  -GJ     LTG 1 Progress  Ongoing  -GJ     LTG 2  pt to report ability to sit > 1 hour without L posterior hip pain to facilitat ease of traveling by car  -GJ     LTG 2 Progress  Ongoing  -GJ     LTG 3  pt to report >/= 50% improvement in his L posterior hip pain to facilitate ease of performing work/recreationall activities  -GJ     LTG 3 Progress  Ongoing  -GJ       User Key  (r) = Recorded By, (t) = Taken By, (c) = Cosigned By    Initials Name Provider Type    Saad Smith, PT Physical Therapist          Therapy Education  Education Details: [id number], discussed activity modifications  Given: HEP, Symptoms/condition management, Pain management, Mobility training, Posture/body mechanics  Program: New  How Provided: Verbal, Other (comment), Demonstration (updated [employer])  Provided to: Patient  Level of Understanding: Teach back education performed, Verbalized, Demonstrated              Time Calculation:   Start Time: 0833  Stop Time: 0900  Time Calculation (min): 27 min  Therapy Charges for Today     Code Description Service Date Service Provider Modifiers Qty    27409648626 HC PT SELF PAY DRY NEEDLING SESSION 3/30/2021 Saad Ace, PT  1                    Saad Ace, PT  3/30/2021

## 2021-05-05 ENCOUNTER — OFFICE VISIT (OUTPATIENT)
Dept: INTERNAL MEDICINE | Facility: CLINIC | Age: 51
End: 2021-05-05

## 2021-05-05 VITALS
WEIGHT: 202 LBS | HEART RATE: 57 BPM | TEMPERATURE: 97.1 F | SYSTOLIC BLOOD PRESSURE: 122 MMHG | DIASTOLIC BLOOD PRESSURE: 60 MMHG | BODY MASS INDEX: 28.92 KG/M2 | RESPIRATION RATE: 18 BRPM | HEIGHT: 70 IN | OXYGEN SATURATION: 98 %

## 2021-05-05 DIAGNOSIS — Z79.899 ON PRE-EXPOSURE PROPHYLAXIS FOR HIV: Primary | ICD-10-CM

## 2021-05-05 PROBLEM — N42.9 DISORDER OF PROSTATE: Status: ACTIVE | Noted: 2019-11-05

## 2021-05-05 PROCEDURE — 99213 OFFICE O/P EST LOW 20 MIN: CPT | Performed by: NURSE PRACTITIONER

## 2021-05-05 RX ORDER — EMTRICITABINE AND TENOFOVIR DISOPROXIL FUMARATE 200; 300 MG/1; MG/1
1 TABLET, FILM COATED ORAL DAILY
Qty: 90 TABLET | Refills: 1 | Status: SHIPPED | OUTPATIENT
Start: 2021-05-05 | End: 2021-11-15

## 2021-05-06 ENCOUNTER — TELEPHONE (OUTPATIENT)
Dept: INTERNAL MEDICINE | Facility: CLINIC | Age: 51
End: 2021-05-06

## 2021-05-06 LAB
ALBUMIN SERPL-MCNC: 4.5 G/DL (ref 3.5–5.2)
ALBUMIN/GLOB SERPL: 2.1 G/DL
ALP SERPL-CCNC: 44 U/L (ref 39–117)
ALT SERPL-CCNC: 25 U/L (ref 1–41)
APPEARANCE UR: CLEAR
AST SERPL-CCNC: 20 U/L (ref 1–40)
BACTERIA #/AREA URNS HPF: NORMAL /HPF
BASOPHILS # BLD AUTO: 0.03 10*3/MM3 (ref 0–0.2)
BASOPHILS NFR BLD AUTO: 0.5 % (ref 0–1.5)
BILIRUB SERPL-MCNC: 0.3 MG/DL (ref 0–1.2)
BILIRUB UR QL STRIP: NEGATIVE
BUN SERPL-MCNC: 15 MG/DL (ref 6–20)
BUN/CREAT SERPL: 15.6 (ref 7–25)
CALCIUM SERPL-MCNC: 9.2 MG/DL (ref 8.6–10.5)
CASTS URNS MICRO: NORMAL
CHLORIDE SERPL-SCNC: 102 MMOL/L (ref 98–107)
CO2 SERPL-SCNC: 28.4 MMOL/L (ref 22–29)
COLOR UR: YELLOW
CREAT SERPL-MCNC: 0.96 MG/DL (ref 0.76–1.27)
EOSINOPHIL # BLD AUTO: 0 10*3/MM3 (ref 0–0.4)
EOSINOPHIL NFR BLD AUTO: 0 % (ref 0.3–6.2)
EPI CELLS #/AREA URNS HPF: NORMAL /HPF
ERYTHROCYTE [DISTWIDTH] IN BLOOD BY AUTOMATED COUNT: 12.5 % (ref 12.3–15.4)
GLOBULIN SER CALC-MCNC: 2.1 GM/DL
GLUCOSE SERPL-MCNC: 92 MG/DL (ref 65–99)
GLUCOSE UR QL: NEGATIVE
HAV IGM SERPL QL IA: NEGATIVE
HBV CORE IGM SERPL QL IA: NEGATIVE
HBV SURFACE AG SERPL QL IA: NEGATIVE
HCT VFR BLD AUTO: 44.4 % (ref 37.5–51)
HCV AB S/CO SERPL IA: <0.1 S/CO RATIO (ref 0–0.9)
HCV AB S/CO SERPL IA: <0.1 S/CO RATIO (ref 0–0.9)
HCV AB SERPL QL IA: NORMAL
HGB BLD-MCNC: 14.8 G/DL (ref 13–17.7)
HGB UR QL STRIP: NEGATIVE
HIV 1+2 AB+HIV1 P24 AG SERPL QL IA: NON REACTIVE
IMM GRANULOCYTES # BLD AUTO: 0.01 10*3/MM3 (ref 0–0.05)
IMM GRANULOCYTES NFR BLD AUTO: 0.2 % (ref 0–0.5)
KETONES UR QL STRIP: NEGATIVE
LEUKOCYTE ESTERASE UR QL STRIP: NEGATIVE
LYMPHOCYTES # BLD AUTO: 2.82 10*3/MM3 (ref 0.7–3.1)
LYMPHOCYTES NFR BLD AUTO: 43.3 % (ref 19.6–45.3)
MCH RBC QN AUTO: 30.8 PG (ref 26.6–33)
MCHC RBC AUTO-ENTMCNC: 33.3 G/DL (ref 31.5–35.7)
MCV RBC AUTO: 92.3 FL (ref 79–97)
MONOCYTES # BLD AUTO: 0.48 10*3/MM3 (ref 0.1–0.9)
MONOCYTES NFR BLD AUTO: 7.4 % (ref 5–12)
NEUTROPHILS # BLD AUTO: 3.18 10*3/MM3 (ref 1.7–7)
NEUTROPHILS NFR BLD AUTO: 48.6 % (ref 42.7–76)
NITRITE UR QL STRIP: NEGATIVE
NRBC BLD AUTO-RTO: 0 /100 WBC (ref 0–0.2)
PH UR STRIP: 6 [PH] (ref 5–8)
PLATELET # BLD AUTO: 225 10*3/MM3 (ref 140–450)
POTASSIUM SERPL-SCNC: 4.2 MMOL/L (ref 3.5–5.2)
PROT SERPL-MCNC: 6.6 G/DL (ref 6–8.5)
PROT UR QL STRIP: NEGATIVE
RBC # BLD AUTO: 4.81 10*6/MM3 (ref 4.14–5.8)
RBC #/AREA URNS HPF: NORMAL /HPF
SODIUM SERPL-SCNC: 140 MMOL/L (ref 136–145)
SP GR UR: 1.01 (ref 1–1.03)
UROBILINOGEN UR STRIP-MCNC: NORMAL MG/DL
WBC # BLD AUTO: 6.52 10*3/MM3 (ref 3.4–10.8)
WBC #/AREA URNS HPF: NORMAL /HPF

## 2021-05-06 NOTE — PROGRESS NOTES
Please notify patient   HIV negative  Hepatitis negative  UA normal   Cbc and cmp normal  UA negative

## 2021-05-06 NOTE — TELEPHONE ENCOUNTER
----- Message from OTILIA Nichols sent at 5/6/2021 11:10 AM EDT -----  Please notify patient   HIV negative  Hepatitis negative  UA normal   Cbc and cmp normal  UA negative

## 2021-05-10 RX ORDER — FAMOTIDINE 20 MG/1
TABLET, FILM COATED ORAL
Qty: 180 TABLET | Refills: 3 | Status: SHIPPED | OUTPATIENT
Start: 2021-05-10 | End: 2022-03-09

## 2021-06-08 DIAGNOSIS — N42.9 DISORDER OF PROSTATE: Primary | ICD-10-CM

## 2021-06-09 RX ORDER — TADALAFIL 5 MG/1
5 TABLET ORAL DAILY PRN
Qty: 30 TABLET | Refills: 3 | Status: SHIPPED | OUTPATIENT
Start: 2021-06-09 | End: 2021-10-07

## 2021-08-04 DIAGNOSIS — Z79.899 ON PRE-EXPOSURE PROPHYLAXIS FOR HIV: Primary | ICD-10-CM

## 2021-08-10 ENCOUNTER — LAB (OUTPATIENT)
Dept: LAB | Facility: HOSPITAL | Age: 51
End: 2021-08-10

## 2021-08-10 ENCOUNTER — TELEPHONE (OUTPATIENT)
Dept: INTERNAL MEDICINE | Facility: CLINIC | Age: 51
End: 2021-08-10

## 2021-08-10 LAB — HIV1+2 AB SER QL: NORMAL

## 2021-08-10 PROCEDURE — G0432 EIA HIV-1/HIV-2 SCREEN: HCPCS | Performed by: NURSE PRACTITIONER

## 2021-08-10 PROCEDURE — 36415 COLL VENOUS BLD VENIPUNCTURE: CPT | Performed by: NURSE PRACTITIONER

## 2021-08-10 NOTE — TELEPHONE ENCOUNTER
----- Message from OTILIA Nichols sent at 8/10/2021 12:45 PM EDT -----  Please notify patient of neg result

## 2021-08-30 ENCOUNTER — DOCUMENTATION (OUTPATIENT)
Dept: PHYSICAL THERAPY | Facility: HOSPITAL | Age: 51
End: 2021-08-30

## 2021-08-30 DIAGNOSIS — M25.559 HIP PAIN: Primary | ICD-10-CM

## 2021-08-30 DIAGNOSIS — M54.50 LEFT LOW BACK PAIN, UNSPECIFIED CHRONICITY, UNSPECIFIED WHETHER SCIATICA PRESENT: ICD-10-CM

## 2021-08-30 DIAGNOSIS — M25.512 ACUTE PAIN OF LEFT SHOULDER: ICD-10-CM

## 2021-08-30 NOTE — THERAPY DISCHARGE NOTE
Outpatient Physical Therapy Discharge Summary         Patient Name: Clarence Hoang  : 1970  MRN: [id number]    Today's Date: 2021    Visit Dx:    ICD-10-CM ICD-9-CM   1. Hip pain  M25.559 719.45   2. Left low back pain, unspecified chronicity, unspecified whether sciatica present  M54.5 724.2   3. Acute pain of left shoulder  M25.512 719.41       PT OP Goals     Row Name 21 0700          PT Short Term Goals    STG Date to Achieve  21  -GJ     STG 1  pt. to be I with initial HEP to facilitate self management of their condition  -GJ     STG 1 Progress  Met  -GJ     STG 2  pt. to be educated in/verbalize understanding of the importance of posture/ergonomics in association with their condition to facilitate self management of their condition  -GJ     STG 2 Progress  Met  -GJ     STG 3  pt to report ability to sit > 3 min without L posterior hip pain to facilitat ease of traveling by car  -GJ     STG 3 Progress  Not Met  -GJ        Long Term Goals    LTG Date to Achieve  21  -GJ     LTG 1  pt. to be I with advanced HEP to facilitate self management of their condition  -GJ     LTG 1 Progress  Partially Met  -GJ     LTG 2  pt to report ability to sit > 1 hour without L posterior hip pain to facilitat ease of traveling by car  -GJ     LTG 2 Progress  Not Met  -GJ     LTG 3  pt to report >/= 50% improvement in his L posterior hip pain to facilitate ease of performing work/recreationall activities  -GJ     LTG 3 Progress  Not Met  -GJ       User Key  (r) = Recorded By, (t) = Taken By, (c) = Cosigned By    Initials Name Provider Type    Saad Smith, PT Physical Therapist          OP PT Discharge Summary  Date of Discharge: 21  Reason for Discharge: Independent  Outcomes Achieved: Patient able to partially acheive established goals  Discharge Destination: Home with home program  Discharge Instructions/Additional Comments: Mr. Hoang attended 9 sessions of physical therapy  from 12/98/2020-3/30/2021 for his shoulder/LBP.  As of his last session, he was ready to attempt self management of his condition. He continued to report difficulty with sitting for prolonged periods of time.  He did not return, therefore he is discharged from outpatient physical therapy to an independent program.      Time Calculation:                    Saad Ace, PT  8/30/2021

## 2021-09-30 ENCOUNTER — TELEPHONE (OUTPATIENT)
Dept: INTERNAL MEDICINE | Facility: CLINIC | Age: 51
End: 2021-09-30

## 2021-09-30 RX ORDER — PANTOPRAZOLE SODIUM 40 MG/1
40 TABLET, DELAYED RELEASE ORAL DAILY
Qty: 90 TABLET | Refills: 3 | Status: SHIPPED | OUTPATIENT
Start: 2021-09-30 | End: 2022-09-21

## 2021-09-30 NOTE — TELEPHONE ENCOUNTER
----- Message from Guy London MA sent at 2021 11:14 AM EDT -----  Regarding: FW: Prescription Question  Contact: [phone]    ----- Message -----  From: Clarence Hoang  Sent: 2021  10:15 AM EDT  To: Paulina [facility]  Subject: Prescription Question                            Could you please refill my pantoprozole under your name so my insurance will cover it.  Thanks. Also my Medicaid will probably  at the end of October. Should I do a dr visit to renew all my prescriptions?

## 2021-10-07 DIAGNOSIS — N42.9 DISORDER OF PROSTATE: ICD-10-CM

## 2021-10-07 RX ORDER — TADALAFIL 5 MG/1
TABLET ORAL
Qty: 90 TABLET | Refills: 0 | Status: SHIPPED | OUTPATIENT
Start: 2021-10-07 | End: 2022-01-05

## 2021-11-15 RX ORDER — EMTRICITABINE AND TENOFOVIR DISOPROXIL FUMARATE 200; 300 MG/1; MG/1
TABLET, FILM COATED ORAL
Qty: 30 TABLET | Refills: 0 | Status: SHIPPED | OUTPATIENT
Start: 2021-11-15 | End: 2021-12-13

## 2021-12-13 RX ORDER — EMTRICITABINE AND TENOFOVIR DISOPROXIL FUMARATE 200; 300 MG/1; MG/1
TABLET, FILM COATED ORAL
Qty: 30 TABLET | Refills: 0 | Status: SHIPPED | OUTPATIENT
Start: 2021-12-13 | End: 2022-01-25

## 2021-12-15 ENCOUNTER — TELEPHONE (OUTPATIENT)
Dept: INTERNAL MEDICINE | Facility: CLINIC | Age: 51
End: 2021-12-15

## 2021-12-15 NOTE — TELEPHONE ENCOUNTER
Caller: Clarence Hoang    Relationship: Self    Best call back number: [phone]    What orders are you requesting (i.e. lab or imaging): LAB ORDERS    In what timeframe would the patient need to come in: WEEK PRIOR TO PHYSICAL (PHYSICAL SCHEDULED 01/05/2022)    Where will you receive your lab/imaging services: IN OFFICE    Additional notes: PLEASE CALL PATIENT AFTER LABS ARE ENTERED SO HE CAN CALL TO SCHEDULE.

## 2021-12-16 DIAGNOSIS — Z79.899 ON PRE-EXPOSURE PROPHYLAXIS FOR HIV: Primary | ICD-10-CM

## 2021-12-16 DIAGNOSIS — Z00.00 ANNUAL PHYSICAL EXAM: ICD-10-CM

## 2021-12-22 ENCOUNTER — TELEPHONE (OUTPATIENT)
Dept: INTERNAL MEDICINE | Facility: CLINIC | Age: 51
End: 2021-12-22

## 2021-12-22 ENCOUNTER — PATIENT MESSAGE (OUTPATIENT)
Dept: INTERNAL MEDICINE | Facility: CLINIC | Age: 51
End: 2021-12-22

## 2021-12-22 NOTE — TELEPHONE ENCOUNTER
----- Message from Clarence Hoang sent at 12/22/2021 10:40 AM EST -----  Regarding: Blood work  I need to reschedule by labs for my physical. Who do I call for that?

## 2021-12-22 NOTE — TELEPHONE ENCOUNTER
From: Clarence Hoang  To: Megan Crowley, OTILIA  Sent: 12/22/2021 10:40 AM EST  Subject: Blood work    I need to reschedule by labs for my physical. Who do I call for that?

## 2021-12-28 ENCOUNTER — LAB (OUTPATIENT)
Dept: LAB | Facility: HOSPITAL | Age: 51
End: 2021-12-28

## 2021-12-28 LAB
ALBUMIN SERPL-MCNC: 4.4 G/DL (ref 3.5–5.2)
ALBUMIN/GLOB SERPL: 1.7 G/DL
ALP SERPL-CCNC: 53 U/L (ref 39–117)
ALT SERPL W P-5'-P-CCNC: 70 U/L (ref 1–41)
ANION GAP SERPL CALCULATED.3IONS-SCNC: 6.8 MMOL/L (ref 5–15)
AST SERPL-CCNC: 40 U/L (ref 1–40)
BACTERIA UR QL AUTO: NORMAL /HPF
BASOPHILS # BLD AUTO: 0.02 10*3/MM3 (ref 0–0.2)
BASOPHILS NFR BLD AUTO: 0.3 % (ref 0–1.5)
BILIRUB SERPL-MCNC: 0.5 MG/DL (ref 0–1.2)
BILIRUB UR QL STRIP: NEGATIVE
BUN SERPL-MCNC: 20 MG/DL (ref 6–20)
BUN/CREAT SERPL: 20.4 (ref 7–25)
CALCIUM SPEC-SCNC: 9.1 MG/DL (ref 8.6–10.5)
CHLORIDE SERPL-SCNC: 102 MMOL/L (ref 98–107)
CHOLEST SERPL-MCNC: 196 MG/DL (ref 0–200)
CLARITY UR: CLEAR
CO2 SERPL-SCNC: 30.2 MMOL/L (ref 22–29)
COLOR UR: YELLOW
CREAT SERPL-MCNC: 0.98 MG/DL (ref 0.76–1.27)
DEPRECATED RDW RBC AUTO: 43.3 FL (ref 37–54)
EOSINOPHIL # BLD AUTO: 0 10*3/MM3 (ref 0–0.4)
EOSINOPHIL NFR BLD AUTO: 0 % (ref 0.3–6.2)
ERYTHROCYTE [DISTWIDTH] IN BLOOD BY AUTOMATED COUNT: 13.1 % (ref 12.3–15.4)
GFR SERPL CREATININE-BSD FRML MDRD: 81 ML/MIN/1.73
GLOBULIN UR ELPH-MCNC: 2.6 GM/DL
GLUCOSE SERPL-MCNC: 98 MG/DL (ref 65–99)
GLUCOSE UR STRIP-MCNC: NEGATIVE MG/DL
HCT VFR BLD AUTO: 42.5 % (ref 37.5–51)
HDLC SERPL-MCNC: 57 MG/DL (ref 40–60)
HGB BLD-MCNC: 14.8 G/DL (ref 13–17.7)
HGB UR QL STRIP.AUTO: NEGATIVE
HIV1+2 AB SER QL: NORMAL
HYALINE CASTS UR QL AUTO: NORMAL /LPF
IMM GRANULOCYTES # BLD AUTO: 0.01 10*3/MM3 (ref 0–0.05)
IMM GRANULOCYTES NFR BLD AUTO: 0.1 % (ref 0–0.5)
KETONES UR QL STRIP: NEGATIVE
LDLC SERPL CALC-MCNC: 124 MG/DL (ref 0–100)
LDLC/HDLC SERPL: 2.16 {RATIO}
LEUKOCYTE ESTERASE UR QL STRIP.AUTO: NEGATIVE
LYMPHOCYTES # BLD AUTO: 2.65 10*3/MM3 (ref 0.7–3.1)
LYMPHOCYTES NFR BLD AUTO: 39.3 % (ref 19.6–45.3)
MCH RBC QN AUTO: 31.8 PG (ref 26.6–33)
MCHC RBC AUTO-ENTMCNC: 34.8 G/DL (ref 31.5–35.7)
MCV RBC AUTO: 91.2 FL (ref 79–97)
MONOCYTES # BLD AUTO: 0.5 10*3/MM3 (ref 0.1–0.9)
MONOCYTES NFR BLD AUTO: 7.4 % (ref 5–12)
NEUTROPHILS NFR BLD AUTO: 3.57 10*3/MM3 (ref 1.7–7)
NEUTROPHILS NFR BLD AUTO: 52.9 % (ref 42.7–76)
NITRITE UR QL STRIP: NEGATIVE
NRBC BLD AUTO-RTO: 0 /100 WBC (ref 0–0.2)
PH UR STRIP.AUTO: 6 [PH] (ref 5–8)
PLATELET # BLD AUTO: 204 10*3/MM3 (ref 140–450)
PMV BLD AUTO: 9.5 FL (ref 6–12)
POTASSIUM SERPL-SCNC: 3.9 MMOL/L (ref 3.5–5.2)
PROT SERPL-MCNC: 7 G/DL (ref 6–8.5)
PROT UR QL STRIP: NEGATIVE
RBC # BLD AUTO: 4.66 10*6/MM3 (ref 4.14–5.8)
RBC # UR STRIP: NORMAL /HPF
REF LAB TEST METHOD: NORMAL
SODIUM SERPL-SCNC: 139 MMOL/L (ref 136–145)
SP GR UR STRIP: 1.02 (ref 1–1.03)
SQUAMOUS #/AREA URNS HPF: NORMAL /HPF
TRIGL SERPL-MCNC: 80 MG/DL (ref 0–150)
TSH SERPL DL<=0.05 MIU/L-ACNC: 0.92 UIU/ML (ref 0.27–4.2)
UROBILINOGEN UR QL STRIP: NORMAL
VLDLC SERPL-MCNC: 15 MG/DL (ref 5–40)
WBC # UR STRIP: NORMAL /HPF
WBC NRBC COR # BLD: 6.75 10*3/MM3 (ref 3.4–10.8)

## 2021-12-28 PROCEDURE — 81001 URINALYSIS AUTO W/SCOPE: CPT | Performed by: NURSE PRACTITIONER

## 2021-12-28 PROCEDURE — G0432 EIA HIV-1/HIV-2 SCREEN: HCPCS | Performed by: NURSE PRACTITIONER

## 2021-12-28 PROCEDURE — 36415 COLL VENOUS BLD VENIPUNCTURE: CPT | Performed by: NURSE PRACTITIONER

## 2021-12-28 PROCEDURE — 80050 GENERAL HEALTH PANEL: CPT | Performed by: NURSE PRACTITIONER

## 2021-12-28 PROCEDURE — 80061 LIPID PANEL: CPT | Performed by: NURSE PRACTITIONER

## 2022-01-04 DIAGNOSIS — N42.9 DISORDER OF PROSTATE: ICD-10-CM

## 2022-01-05 ENCOUNTER — OFFICE VISIT (OUTPATIENT)
Dept: INTERNAL MEDICINE | Facility: CLINIC | Age: 52
End: 2022-01-05

## 2022-01-05 VITALS
BODY MASS INDEX: 28.92 KG/M2 | OXYGEN SATURATION: 96 % | DIASTOLIC BLOOD PRESSURE: 60 MMHG | WEIGHT: 202 LBS | TEMPERATURE: 96.9 F | HEART RATE: 65 BPM | SYSTOLIC BLOOD PRESSURE: 120 MMHG | HEIGHT: 70 IN

## 2022-01-05 DIAGNOSIS — Z12.2 ENCOUNTER FOR SCREENING FOR LUNG CANCER: ICD-10-CM

## 2022-01-05 DIAGNOSIS — Z00.00 ANNUAL PHYSICAL EXAM: Primary | ICD-10-CM

## 2022-01-05 DIAGNOSIS — Z72.0 TOBACCO USE: ICD-10-CM

## 2022-01-05 DIAGNOSIS — Z12.5 SCREENING FOR PROSTATE CANCER: ICD-10-CM

## 2022-01-05 PROCEDURE — 99396 PREV VISIT EST AGE 40-64: CPT | Performed by: NURSE PRACTITIONER

## 2022-01-05 RX ORDER — TADALAFIL 5 MG/1
TABLET ORAL
Qty: 90 TABLET | Refills: 0 | Status: SHIPPED | OUTPATIENT
Start: 2022-01-05 | End: 2022-04-13

## 2022-01-05 NOTE — PROGRESS NOTES
Subjective   Clarence Hoang is a 52 y.o. male. Patient is here today for   Chief Complaint   Patient presents with   • Annual Exam          Vitals:    22 1106   BP: 120/60   Pulse: 65   Temp: 96.9 °F (36.1 °C)   SpO2: 96%     Body mass index is 28.98 kg/m².    The following portions of the patient's history were reviewed and updated as appropriate: allergies, current medications, past family history, past medical history, past social history, past surgical history and problem list.    Past Medical History:   Diagnosis Date   • Anxiety    • GERD (gastroesophageal reflux disease)    • Overactive bladder       No Known Allergies   Social History     Socioeconomic History   • Marital status: Single   Tobacco Use   • Smoking status: Former Smoker     Packs/day: 2.00     Years: 30.00     Pack years: 60.00     Types: Cigarettes     Quit date: 2017     Years since quittin.0   • Smokeless tobacco: Current User   • Tobacco comment: vaping    Vaping Use   • Vaping Use: Every day   • Substances: Nicotine, Flavoring   • Devices: Pre-filled or refillable cartridge   Substance and Sexual Activity   • Alcohol use: Not Currently   • Drug use: Never   • Sexual activity: Not Currently        Current Outpatient Medications:   •  ALPRAZolam (XANAX) 0.5 MG tablet, Take 1 tablet by mouth 2 (Two) Times a Day As Needed for Anxiety., Disp: 10 tablet, Rfl: 0  •  emtricitabine-tenofovir (TRUVADA) 200-300 MG per tablet, TAKE ONE TABLET BY MOUTH DAILY, Disp: 30 tablet, Rfl: 0  •  famotidine (PEPCID) 20 MG tablet, TAKE ONE TABLET BY MOUTH TWICE A DAY, Disp: 180 tablet, Rfl: 3  •  mirtazapine (REMERON) 15 MG tablet, , Disp: , Rfl:   •  Neomycin-Polymyxin-Dexameth 0.1 % ointment, Apply  to eye(s) as directed by provider Every 12 (Twelve) Hours., Disp: , Rfl:   •  pantoprazole (PROTONIX) 40 MG EC tablet, Take 1 tablet by mouth Daily., Disp: 90 tablet, Rfl: 3  •  tadalafil (CIALIS) 5 MG tablet, TAKE ONE TABLET BY MOUTH DAILY AS  NEEDED FOR ERECTILE DYSFUNCTION, Disp: 90 tablet, Rfl: 0  •  traZODone (DESYREL) 100 MG tablet, Take 100 mg by mouth Every Night., Disp: , Rfl:   •  varenicline (Chantix Starting Month Pak) 0.5 MG X 11 & 1 MG X 42 tablet, Take 0.5 mg one daily on days 1-2 and 0.5 mg twice daily on days 4-7. Then 1 mg twice daily for a total of 12 weeks., Disp: 53 tablet, Rfl: 0       Objective   History of Present Illness   Clarence Hoang 52 y.o. male who presents for an Annual Wellness Visit.  he has a history of   Patient Active Problem List   Diagnosis   • Hiatal hernia   • Generalized anxiety disorder   • Disorder of prostate   .  he has been doing well with no interval problems.  He would like to quit vaping. He has started nicotine gum but would like to discuss other options.   He is on Truvada for prep  He sees psychiatry for anxiety    Labs results discussed in detail with the patient.  Plan to update vaccines if needed today.    Health Habits:  Dental Exam. up to date  Eye Exam. up to date  Exercise: 7 times/week.  Current exercise activities include: weightlifting   Diet is well balanced     Clarence Hoang  reports that he quit smoking about 5 years ago. His smoking use included cigarettes. He has a 60.00 pack-year smoking history. He uses smokeless tobacco.. He currently is using e cigs to vap. He would like to quit  I have educated him on the risk of diseases from using tobacco products such as cancer, COPD and heart disease.     I advised him to quit and he is willing to quit. We have discussed the following method/s for tobacco cessation:  OTC Cessation Products Prescription Medicaiton.  Together we have set a quit date for 2 weeks from today.  He will follow up with me in 1 month or sooner to check on his progress.    I spent 3  minutes counseling the patient.           Lab Results (most recent)     None            Review of Systems   Constitutional: Negative for fatigue.   HENT: Negative.    Respiratory:  Negative.    Cardiovascular: Negative.    Gastrointestinal: Negative.    Genitourinary: Negative for difficulty urinating, frequency and urgency.   Musculoskeletal: Negative.    Neurological: Negative.    Hematological: Negative.    Psychiatric/Behavioral: The patient is nervous/anxious.        Physical Exam  Vitals and nursing note reviewed.   Constitutional:       General: He is not in acute distress.     Appearance: Normal appearance. He is well-developed and well-groomed.   HENT:      Head: Normocephalic.      Right Ear: Tympanic membrane and ear canal normal.      Left Ear: Tympanic membrane and ear canal normal.   Eyes:      General: Lids are normal.      Conjunctiva/sclera: Conjunctivae normal.   Neck:      Thyroid: No thyromegaly.   Cardiovascular:      Rate and Rhythm: Normal rate and regular rhythm.   Pulmonary:      Effort: Pulmonary effort is normal.      Breath sounds: Normal breath sounds.   Abdominal:      General: Bowel sounds are normal.      Palpations: Abdomen is soft.   Musculoskeletal:      Cervical back: Neck supple.   Skin:     General: Skin is warm and dry.   Neurological:      Mental Status: He is alert and oriented to person, place, and time.   Psychiatric:         Mood and Affect: Mood normal.         ASSESSMENT       Problems Addressed this Visit     None      Visit Diagnoses     Annual physical exam    -  Primary    Tobacco use        Relevant Orders     CT Chest Low Dose Cancer Screening WO    Encounter for screening for lung cancer        Relevant Orders     CT Chest Low Dose Cancer Screening WO    Screening for prostate cancer        Relevant Orders    PSA Screen      Diagnoses       Codes Comments    Annual physical exam    -  Primary ICD-10-CM: Z00.00  ICD-9-CM: V70.0     Tobacco use     ICD-10-CM: Z72.0  ICD-9-CM: 305.1     Encounter for screening for lung cancer     ICD-10-CM: Z12.2  ICD-9-CM: V76.0     Screening for prostate cancer     ICD-10-CM: Z12.5  ICD-9-CM: V76.44            PLAN    BP is normal  Continue with healthy diet and exercise  Continue current medications  Low dose CT screening ordered   chantix ordered for smoking cessation- must quit smoking within 2 weeks, discussed potential side effects and black box warnings  Recommend covid and shingrix vaccines- pt will get at pharmacy  PSA added on to labs  Age and sex appropriate physical exam performed and documented. Updated past medical, family, social and surgical histories as well as allergies and care team list. Addressed care gaps listed in the medical record.   -Encouraged seat belt use for every car ride for patient and all occupants. Discussed securing of all guns in the home for patient and family protection. Encouraged sunscreen use to reduce risk of skin cancer for any days with sun exposure over 20 minutes. Recommended helmet if biking or riding motorcycle to prevent head trauma. Discussed the importance of smoke and carbon monoxide detectors in the home.   -Encouraged annual dental and vision exams as part of their overall health.   -Encouraged minimum of 30 minutes or more of exercise at a brisk walk or higher 5 days per week combined with a well-balanced diet.   -Immunizations reviewed and updated in EMR.     Follow up in one year for CPE with labs  Will return in 6 months for HIV test for PREP. He will let me know in one month how he is doing on chantix

## 2022-01-19 ENCOUNTER — APPOINTMENT (OUTPATIENT)
Dept: PET IMAGING | Facility: HOSPITAL | Age: 52
End: 2022-01-19

## 2022-01-25 RX ORDER — EMTRICITABINE AND TENOFOVIR DISOPROXIL FUMARATE 200; 300 MG/1; MG/1
TABLET, FILM COATED ORAL
Qty: 30 TABLET | Refills: 0 | Status: SHIPPED | OUTPATIENT
Start: 2022-01-25 | End: 2022-02-22

## 2022-02-02 ENCOUNTER — HOSPITAL ENCOUNTER (OUTPATIENT)
Dept: PET IMAGING | Facility: HOSPITAL | Age: 52
Discharge: HOME | End: 2022-02-02
Admitting: NURSE PRACTITIONER

## 2022-02-02 PROCEDURE — 71271 CT THORAX LUNG CANCER SCR C-: CPT

## 2022-02-22 RX ORDER — EMTRICITABINE AND TENOFOVIR DISOPROXIL FUMARATE 200; 300 MG/1; MG/1
TABLET, FILM COATED ORAL
Qty: 30 TABLET | Refills: 0 | Status: SHIPPED | OUTPATIENT
Start: 2022-02-22 | End: 2022-03-30

## 2022-03-09 RX ORDER — FAMOTIDINE 20 MG/1
TABLET, FILM COATED ORAL
Qty: 180 TABLET | Refills: 3 | Status: SHIPPED | OUTPATIENT
Start: 2022-03-09 | End: 2023-03-16

## 2022-03-30 RX ORDER — EMTRICITABINE AND TENOFOVIR DISOPROXIL FUMARATE 200; 300 MG/1; MG/1
TABLET, FILM COATED ORAL
Qty: 30 TABLET | Refills: 5 | Status: SHIPPED | OUTPATIENT
Start: 2022-03-30 | End: 2022-09-27

## 2022-04-13 DIAGNOSIS — N42.9 DISORDER OF PROSTATE: ICD-10-CM

## 2022-04-13 RX ORDER — TADALAFIL 5 MG/1
TABLET ORAL
Qty: 90 TABLET | Refills: 0 | Status: SHIPPED | OUTPATIENT
Start: 2022-04-13 | End: 2022-07-07

## 2022-07-07 DIAGNOSIS — N42.9 DISORDER OF PROSTATE: ICD-10-CM

## 2022-07-07 RX ORDER — TADALAFIL 5 MG/1
TABLET ORAL
Qty: 90 TABLET | Refills: 0 | Status: SHIPPED | OUTPATIENT
Start: 2022-07-07 | End: 2022-10-04

## 2022-08-12 ENCOUNTER — TELEPHONE (OUTPATIENT)
Dept: INTERNAL MEDICINE | Facility: CLINIC | Age: 52
End: 2022-08-12

## 2022-08-12 NOTE — TELEPHONE ENCOUNTER
----- Message from Ileana Caraballo MA/LMR sent at 8/11/2022  4:44 PM EDT -----  Regarding: FW: Xanax    ----- Message -----  From: Clarence Hoang  Sent: 8/11/2022   4:30 PM EDT  To: [facility]ina [facility]  Subject: Xanax                                            Would you be able to refill my Xanax prescription? I think Dr. Henry is retiring plus they would be covered by my insurance if they are prescribed by you. It’s alprazolam .5MG once daily. 3 month supply. If you need to see me first I understand.

## 2022-08-15 ENCOUNTER — OFFICE VISIT (OUTPATIENT)
Dept: INTERNAL MEDICINE | Facility: CLINIC | Age: 52
End: 2022-08-15

## 2022-08-15 VITALS
OXYGEN SATURATION: 97 % | HEART RATE: 78 BPM | BODY MASS INDEX: 26.92 KG/M2 | SYSTOLIC BLOOD PRESSURE: 110 MMHG | RESPIRATION RATE: 16 BRPM | WEIGHT: 188 LBS | TEMPERATURE: 97.8 F | DIASTOLIC BLOOD PRESSURE: 60 MMHG | HEIGHT: 70 IN

## 2022-08-15 DIAGNOSIS — F41.1 GENERALIZED ANXIETY DISORDER: Primary | ICD-10-CM

## 2022-08-15 PROCEDURE — 99213 OFFICE O/P EST LOW 20 MIN: CPT | Performed by: NURSE PRACTITIONER

## 2022-08-15 RX ORDER — TRAZODONE HYDROCHLORIDE 100 MG/1
100 TABLET ORAL NIGHTLY
Qty: 90 TABLET | Refills: 3 | Status: SHIPPED | OUTPATIENT
Start: 2022-08-15 | End: 2023-01-04 | Stop reason: SDUPTHER

## 2022-08-15 RX ORDER — ALPRAZOLAM 0.5 MG/1
0.5 TABLET ORAL 3 TIMES DAILY PRN
Qty: 90 TABLET | Refills: 1 | Status: SHIPPED | OUTPATIENT
Start: 2022-08-15 | End: 2023-03-16

## 2022-08-15 NOTE — PROGRESS NOTES
Subjective   Clarence Hoang is a 52 y.o. male. Patient is here today for   Chief Complaint   Patient presents with   • Anxiety   Answers for HPI/ROS submitted by the patient on 2022  What is the primary reason for your visit?: Other  Please describe your symptoms.: Anxiety  Have you had these symptoms before?: Yes  How long have you been having these symptoms?: Greater than 2 weeks           Vitals:    08/15/22 1116   BP: 110/60   Pulse: 78   Resp: 16   Temp: 97.8 °F (36.6 °C)   SpO2: 97%     Body mass index is 26.98 kg/m².  The following portions of the patient's history were reviewed and updated as appropriate: allergies, current medications, past family history, past medical history, past social history, past surgical history and problem list.    Past Medical History:   Diagnosis Date   • Anxiety    • GERD (gastroesophageal reflux disease)    • Overactive bladder       No Known Allergies   Social History     Socioeconomic History   • Marital status: Single   Tobacco Use   • Smoking status: Former Smoker     Packs/day: 2.00     Years: 30.00     Pack years: 60.00     Types: Cigarettes     Quit date: 2017     Years since quittin.6   • Smokeless tobacco: Current User   • Tobacco comment: vaping    Vaping Use   • Vaping Use: Every day   • Substances: Nicotine, Flavoring   • Devices: Pre-filled or refillable cartridge   Substance and Sexual Activity   • Alcohol use: Not Currently   • Drug use: Never   • Sexual activity: Not Currently        Current Outpatient Medications:   •  emtricitabine-tenofovir (TRUVADA) 200-300 MG per tablet, TAKE ONE TABLET BY MOUTH DAILY, Disp: 30 tablet, Rfl: 5  •  famotidine (PEPCID) 20 MG tablet, TAKE ONE TABLET BY MOUTH TWICE A DAY, Disp: 180 tablet, Rfl: 3  •  Neomycin-Polymyxin-Dexameth 0.1 % ointment, Apply  to eye(s) as directed by provider Every 12 (Twelve) Hours., Disp: , Rfl:   •  pantoprazole (PROTONIX) 40 MG EC tablet, Take 1 tablet by mouth Daily., Disp: 90  tablet, Rfl: 3  •  tadalafil (CIALIS) 5 MG tablet, TAKE ONE TABLET BY MOUTH DAILY AS NEEDED FOR ERECTILE DYSFUNCTION, Disp: 90 tablet, Rfl: 0  •  traZODone (DESYREL) 100 MG tablet, Take 1 tablet by mouth Every Night., Disp: 90 tablet, Rfl: 3  •  ALPRAZolam (XANAX) 0.5 MG tablet, Take 1 tablet by mouth 3 (Three) Times a Day As Needed for Anxiety., Disp: 90 tablet, Rfl: 1     Objective     History of Present Illness  Norberto is a 52 year old male patient who is here to discuss anxiety. He has been seeing a psychiatrist, Dr Henry, who has prescribed trazodone and alprazolam as needed. She has left her practice and he needs medications refills. He has taken alprazolam as needed for more than 2 years. It works well for him and he tolerates it without difficulty.     Review of Systems   Respiratory: Negative.    Cardiovascular: Negative.    Psychiatric/Behavioral: Negative for dysphoric mood. The patient is nervous/anxious.        Physical Exam  Vitals and nursing note reviewed.   Constitutional:       General: He is not in acute distress.     Appearance: Normal appearance. He is well-developed and well-groomed.   HENT:      Head: Normocephalic.   Cardiovascular:      Rate and Rhythm: Normal rate.   Pulmonary:      Effort: Pulmonary effort is normal.   Skin:     General: Skin is warm and dry.   Neurological:      Mental Status: He is alert and oriented to person, place, and time.   Psychiatric:         Mood and Affect: Mood normal.         ASSESSMENT     Problems Addressed this Visit     Generalized anxiety disorder - Primary    Relevant Medications    ALPRAZolam (XANAX) 0.5 MG tablet    traZODone (DESYREL) 100 MG tablet    Other Relevant Orders    Urine Drug Screen - Urine, Clean Catch      Diagnoses       Codes Comments    Generalized anxiety disorder    -  Primary ICD-10-CM: F41.1  ICD-9-CM: 300.02           PLAN    rx for alprazolam given  Trazodone refilled  He is no longer taken remeron   Patient has completed the  prescribing agreement detailing the terms of continued prescribing of controlled substances including monitoring TAHIR reports, Urine drug screens, and pill counts if necessary. The patient is aware that inappropriate use will result in cessation of prescribing such medications. TAHIR report has been reviewed  Date of last TAHIR 8/15/22  History and Physical exam exhibit continued safe and appropriate use of controlled substances

## 2022-08-16 LAB
AMPHETAMINES UR QL SCN: NEGATIVE NG/ML
BARBITURATES UR QL SCN: NEGATIVE NG/ML
BENZODIAZ UR QL SCN: POSITIVE NG/ML
BZE UR QL SCN: NEGATIVE NG/ML
CANNABINOIDS UR QL SCN: POSITIVE NG/ML
CREAT UR-MCNC: 132.5 MG/DL (ref 20–300)
LABORATORY COMMENT REPORT: ABNORMAL
METHADONE UR QL SCN: NEGATIVE NG/ML
OPIATES UR QL SCN: NEGATIVE NG/ML
OXYCODONE+OXYMORPHONE UR QL SCN: NEGATIVE NG/ML
PCP UR QL: NEGATIVE NG/ML
PH UR: 6 [PH] (ref 4.5–8.9)
PROPOXYPH UR QL SCN: NEGATIVE NG/ML

## 2022-08-17 ENCOUNTER — TELEPHONE (OUTPATIENT)
Dept: INTERNAL MEDICINE | Facility: CLINIC | Age: 52
End: 2022-08-17

## 2022-08-17 NOTE — TELEPHONE ENCOUNTER
----- Message from OTILIA Nichols sent at 8/17/2022  9:52 AM EDT -----  Please notify him that his UDS is positive for THC, recommend he avoid illicit drugs while taking alprazolam, will recheck UDS at next visit .

## 2022-09-21 RX ORDER — PANTOPRAZOLE SODIUM 40 MG/1
TABLET, DELAYED RELEASE ORAL
Qty: 90 TABLET | Refills: 0 | Status: SHIPPED | OUTPATIENT
Start: 2022-09-21 | End: 2022-12-23

## 2022-09-27 RX ORDER — EMTRICITABINE AND TENOFOVIR DISOPROXIL FUMARATE 200; 300 MG/1; MG/1
TABLET, FILM COATED ORAL
Qty: 30 TABLET | Refills: 5 | Status: SHIPPED | OUTPATIENT
Start: 2022-09-27 | End: 2022-11-17

## 2022-10-04 DIAGNOSIS — N42.9 DISORDER OF PROSTATE: ICD-10-CM

## 2022-10-04 RX ORDER — TADALAFIL 5 MG/1
TABLET ORAL
Qty: 90 TABLET | Refills: 0 | Status: SHIPPED | OUTPATIENT
Start: 2022-10-04 | End: 2023-01-03

## 2022-11-17 ENCOUNTER — OFFICE VISIT (OUTPATIENT)
Dept: INTERNAL MEDICINE | Facility: CLINIC | Age: 52
End: 2022-11-17

## 2022-11-17 VITALS
WEIGHT: 193 LBS | HEIGHT: 70 IN | OXYGEN SATURATION: 98 % | HEART RATE: 78 BPM | DIASTOLIC BLOOD PRESSURE: 70 MMHG | BODY MASS INDEX: 27.63 KG/M2 | SYSTOLIC BLOOD PRESSURE: 112 MMHG | RESPIRATION RATE: 16 BRPM

## 2022-11-17 DIAGNOSIS — R00.2 PALPITATIONS: Primary | ICD-10-CM

## 2022-11-17 PROCEDURE — 99213 OFFICE O/P EST LOW 20 MIN: CPT | Performed by: NURSE PRACTITIONER

## 2022-11-17 PROCEDURE — 93000 ELECTROCARDIOGRAM COMPLETE: CPT | Performed by: NURSE PRACTITIONER

## 2022-11-17 NOTE — PROGRESS NOTES
Subjective   Clarence Hoang is a 52 y.o. male. Patient is here today for   Chief Complaint   Patient presents with   • Rapid Heart Rate        Answers for HPI/ROS submitted by the patient on 2022  What is the primary reason for your visit?: Other  Please describe your symptoms.: Random elevated heart rate  Have you had these symptoms before?: No  How long have you been having these symptoms?: Greater than 2 weeks  Please list any medications you are currently taking for this condition.: None  Please describe any probable cause for these symptoms. : Not sure      Vitals:    22 1550   BP: 112/70   Pulse: 78   Resp: 16   SpO2: 98%     Body mass index is 27.69 kg/m².  The following portions of the patient's history were reviewed and updated as appropriate: allergies, current medications, past family history, past medical history, past social history, past surgical history and problem list.    Past Medical History:   Diagnosis Date   • Anxiety    • GERD (gastroesophageal reflux disease)    • Overactive bladder       No Known Allergies   Social History     Socioeconomic History   • Marital status: Single   Tobacco Use   • Smoking status: Former     Packs/day: 2.00     Years: 30.00     Pack years: 60.00     Types: Cigarettes     Quit date: 2017     Years since quittin.8   • Smokeless tobacco: Current   • Tobacco comments:     vaping    Vaping Use   • Vaping Use: Every day   • Substances: Nicotine, Flavoring   • Devices: Pre-filled or refillable cartridge   Substance and Sexual Activity   • Alcohol use: Not Currently   • Drug use: Never   • Sexual activity: Not Currently        Current Outpatient Medications:   •  ALPRAZolam (XANAX) 0.5 MG tablet, Take 1 tablet by mouth 3 (Three) Times a Day As Needed for Anxiety., Disp: 90 tablet, Rfl: 1  •  famotidine (PEPCID) 20 MG tablet, TAKE ONE TABLET BY MOUTH TWICE A DAY, Disp: 180 tablet, Rfl: 3  •  pantoprazole (PROTONIX) 40 MG EC tablet, TAKE ONE TABLET BY  MOUTH DAILY, Disp: 90 tablet, Rfl: 0  •  tadalafil (CIALIS) 5 MG tablet, TAKE ONE TABLET BY MOUTH DAILY AS NEEDED FOR ERECTILE DYSFUNCTION, Disp: 90 tablet, Rfl: 0  •  traZODone (DESYREL) 100 MG tablet, Take 1 tablet by mouth Every Night., Disp: 90 tablet, Rfl: 3     Objective     History of Present Illness  Norberto is a 52 year old male patient who is here for an acute visit. He c/o three episodes of elevated HR and palpitations that occurred over the last few months. The most recent he was at work talking to someone and his HR was 150. It stayed elevated for about 2 hours.   Palpitations   This is a new problem. The current episode started more than 1 month ago. The problem occurs every several days. Episode Length: longest episode was 2 hours  Nothing (he does not drink caffeine ) aggravates the symptoms. Pertinent negatives include no chest fullness, diaphoresis, dizziness, irregular heartbeat, malaise/fatigue, nausea, near-syncope, numbness, shortness of breath or weakness. He has tried nothing for the symptoms. Risk factors include being male. His past medical history is significant for anxiety.        ECG 12 Lead    Date/Time: 11/17/2022 4:14 PM  Performed by: Megan Crowley APRN  Authorized by: Megan Crowley APRN   Comparison: compared with previous ECG from 12/11/2020  Similar to previous ECG  Rhythm: sinus rhythm    Clinical impression: normal ECG          Review of Systems   Constitutional: Negative for diaphoresis and malaise/fatigue.   Respiratory: Negative for shortness of breath.    Cardiovascular: Positive for palpitations. Negative for near-syncope.   Gastrointestinal: Negative for nausea.   Neurological: Negative for dizziness, weakness and numbness.       Physical Exam  Vitals and nursing note reviewed.   Constitutional:       General: He is not in acute distress.     Appearance: Normal appearance. He is well-developed and well-groomed.   HENT:      Head: Normocephalic.   Cardiovascular:       Rate and Rhythm: Normal rate and regular rhythm.      Heart sounds: Normal heart sounds.   Pulmonary:      Effort: Pulmonary effort is normal.      Breath sounds: Normal breath sounds.   Neurological:      Mental Status: He is alert.           ASSESSMENT     Problems Addressed this Visit    None  Visit Diagnoses     Palpitations    -  Primary    Relevant Orders    CBC & Differential    Comprehensive Metabolic Panel    TSH Rfx On Abnormal To Free T4    Holter Monitor - 72 Hour Up To 15 Days      Diagnoses       Codes Comments    Palpitations    -  Primary ICD-10-CM: R00.2  ICD-9-CM: 785.1           PLAN    EKG is normal   Will check labs today and call with results  holter monitor ordered  Recommend that he continue to avoid caffeine, avoid decongestants, stay hydrated  Follow up if symptoms persist, worsen or new symptoms develop

## 2022-11-18 LAB
ALBUMIN SERPL-MCNC: 4.9 G/DL (ref 3.5–5.2)
ALBUMIN/GLOB SERPL: 2.5 G/DL
ALP SERPL-CCNC: 47 U/L (ref 39–117)
ALT SERPL-CCNC: 27 U/L (ref 1–41)
AST SERPL-CCNC: 24 U/L (ref 1–40)
BASOPHILS # BLD AUTO: 0.02 10*3/MM3 (ref 0–0.2)
BASOPHILS NFR BLD AUTO: 0.2 % (ref 0–1.5)
BILIRUB SERPL-MCNC: 0.4 MG/DL (ref 0–1.2)
BUN SERPL-MCNC: 17 MG/DL (ref 6–20)
BUN/CREAT SERPL: 15.3 (ref 7–25)
CALCIUM SERPL-MCNC: 9.7 MG/DL (ref 8.6–10.5)
CHLORIDE SERPL-SCNC: 102 MMOL/L (ref 98–107)
CO2 SERPL-SCNC: 28.9 MMOL/L (ref 22–29)
CREAT SERPL-MCNC: 1.11 MG/DL (ref 0.76–1.27)
EGFRCR SERPLBLD CKD-EPI 2021: 80.4 ML/MIN/1.73
EOSINOPHIL # BLD AUTO: 0 10*3/MM3 (ref 0–0.4)
EOSINOPHIL NFR BLD AUTO: 0 % (ref 0.3–6.2)
ERYTHROCYTE [DISTWIDTH] IN BLOOD BY AUTOMATED COUNT: 12.2 % (ref 12.3–15.4)
GLOBULIN SER CALC-MCNC: 2 GM/DL
GLUCOSE SERPL-MCNC: 92 MG/DL (ref 65–99)
HCT VFR BLD AUTO: 41.3 % (ref 37.5–51)
HGB BLD-MCNC: 14.1 G/DL (ref 13–17.7)
IMM GRANULOCYTES # BLD AUTO: 0.03 10*3/MM3 (ref 0–0.05)
IMM GRANULOCYTES NFR BLD AUTO: 0.3 % (ref 0–0.5)
LYMPHOCYTES # BLD AUTO: 3.13 10*3/MM3 (ref 0.7–3.1)
LYMPHOCYTES NFR BLD AUTO: 36 % (ref 19.6–45.3)
MCH RBC QN AUTO: 30.7 PG (ref 26.6–33)
MCHC RBC AUTO-ENTMCNC: 34.1 G/DL (ref 31.5–35.7)
MCV RBC AUTO: 89.8 FL (ref 79–97)
MONOCYTES # BLD AUTO: 0.45 10*3/MM3 (ref 0.1–0.9)
MONOCYTES NFR BLD AUTO: 5.2 % (ref 5–12)
NEUTROPHILS # BLD AUTO: 5.07 10*3/MM3 (ref 1.7–7)
NEUTROPHILS NFR BLD AUTO: 58.3 % (ref 42.7–76)
NRBC BLD AUTO-RTO: 0 /100 WBC (ref 0–0.2)
PLATELET # BLD AUTO: 213 10*3/MM3 (ref 140–450)
POTASSIUM SERPL-SCNC: 4.2 MMOL/L (ref 3.5–5.2)
PROT SERPL-MCNC: 6.9 G/DL (ref 6–8.5)
RBC # BLD AUTO: 4.6 10*6/MM3 (ref 4.14–5.8)
SODIUM SERPL-SCNC: 139 MMOL/L (ref 136–145)
TSH SERPL DL<=0.005 MIU/L-ACNC: 0.49 UIU/ML (ref 0.27–4.2)
WBC # BLD AUTO: 8.7 10*3/MM3 (ref 3.4–10.8)

## 2022-12-05 DIAGNOSIS — Z00.00 ANNUAL PHYSICAL EXAM: Primary | ICD-10-CM

## 2022-12-05 DIAGNOSIS — Z12.5 SCREENING FOR PROSTATE CANCER: ICD-10-CM

## 2022-12-23 RX ORDER — PANTOPRAZOLE SODIUM 40 MG/1
TABLET, DELAYED RELEASE ORAL
Qty: 90 TABLET | Refills: 0 | Status: SHIPPED | OUTPATIENT
Start: 2022-12-23 | End: 2023-03-27

## 2022-12-30 DIAGNOSIS — N42.9 DISORDER OF PROSTATE: ICD-10-CM

## 2023-01-03 RX ORDER — TADALAFIL 5 MG/1
TABLET ORAL
Qty: 90 TABLET | Refills: 0 | Status: SHIPPED | OUTPATIENT
Start: 2023-01-03

## 2023-01-04 ENCOUNTER — TELEPHONE (OUTPATIENT)
Dept: INTERNAL MEDICINE | Facility: CLINIC | Age: 53
End: 2023-01-04
Payer: MEDICAID

## 2023-01-04 LAB
ALBUMIN SERPL-MCNC: 4.6 G/DL (ref 3.5–5.2)
ALBUMIN/GLOB SERPL: 2.2 G/DL
ALP SERPL-CCNC: 43 U/L (ref 39–117)
ALT SERPL-CCNC: 22 U/L (ref 1–41)
APPEARANCE UR: CLEAR
AST SERPL-CCNC: 21 U/L (ref 1–40)
BACTERIA #/AREA URNS HPF: ABNORMAL /HPF
BASOPHILS # BLD AUTO: 0.02 10*3/MM3 (ref 0–0.2)
BASOPHILS NFR BLD AUTO: 0.3 % (ref 0–1.5)
BILIRUB SERPL-MCNC: 0.3 MG/DL (ref 0–1.2)
BILIRUB UR QL STRIP: NEGATIVE
BUN SERPL-MCNC: 16 MG/DL (ref 6–20)
BUN/CREAT SERPL: 15.4 (ref 7–25)
CALCIUM SERPL-MCNC: 9.1 MG/DL (ref 8.6–10.5)
CASTS URNS MICRO: ABNORMAL
CHLORIDE SERPL-SCNC: 104 MMOL/L (ref 98–107)
CHOLEST SERPL-MCNC: 203 MG/DL (ref 0–200)
CO2 SERPL-SCNC: 28.9 MMOL/L (ref 22–29)
COLOR UR: YELLOW
CREAT SERPL-MCNC: 1.04 MG/DL (ref 0.76–1.27)
EGFRCR SERPLBLD CKD-EPI 2021: 86.9 ML/MIN/1.73
EOSINOPHIL # BLD AUTO: 0 10*3/MM3 (ref 0–0.4)
EOSINOPHIL NFR BLD AUTO: 0 % (ref 0.3–6.2)
EPI CELLS #/AREA URNS HPF: ABNORMAL /HPF
ERYTHROCYTE [DISTWIDTH] IN BLOOD BY AUTOMATED COUNT: 12.3 % (ref 12.3–15.4)
GLOBULIN SER CALC-MCNC: 2.1 GM/DL
GLUCOSE SERPL-MCNC: 84 MG/DL (ref 65–99)
GLUCOSE UR QL STRIP: NEGATIVE
HCT VFR BLD AUTO: 45.8 % (ref 37.5–51)
HDLC SERPL-MCNC: 56 MG/DL (ref 40–60)
HGB BLD-MCNC: 15.1 G/DL (ref 13–17.7)
HGB UR QL STRIP: NEGATIVE
IMM GRANULOCYTES # BLD AUTO: 0.01 10*3/MM3 (ref 0–0.05)
IMM GRANULOCYTES NFR BLD AUTO: 0.1 % (ref 0–0.5)
KETONES UR QL STRIP: ABNORMAL
LDLC SERPL CALC-MCNC: 136 MG/DL (ref 0–100)
LDLC/HDLC SERPL: 2.41 {RATIO}
LEUKOCYTE ESTERASE UR QL STRIP: ABNORMAL
LYMPHOCYTES # BLD AUTO: 3.69 10*3/MM3 (ref 0.7–3.1)
LYMPHOCYTES NFR BLD AUTO: 48.6 % (ref 19.6–45.3)
MCH RBC QN AUTO: 30.5 PG (ref 26.6–33)
MCHC RBC AUTO-ENTMCNC: 33 G/DL (ref 31.5–35.7)
MCV RBC AUTO: 92.5 FL (ref 79–97)
MONOCYTES # BLD AUTO: 0.53 10*3/MM3 (ref 0.1–0.9)
MONOCYTES NFR BLD AUTO: 7 % (ref 5–12)
MUCOUS THREADS URNS QL MICRO: ABNORMAL /HPF
NEUTROPHILS # BLD AUTO: 3.34 10*3/MM3 (ref 1.7–7)
NEUTROPHILS NFR BLD AUTO: 44 % (ref 42.7–76)
NITRITE UR QL STRIP: NEGATIVE
NRBC BLD AUTO-RTO: 0.3 /100 WBC (ref 0–0.2)
PH UR STRIP: 6 [PH] (ref 5–8)
PLATELET # BLD AUTO: 226 10*3/MM3 (ref 140–450)
POTASSIUM SERPL-SCNC: 4.2 MMOL/L (ref 3.5–5.2)
PROT SERPL-MCNC: 6.7 G/DL (ref 6–8.5)
PROT UR QL STRIP: ABNORMAL
PSA SERPL-MCNC: 1.73 NG/ML (ref 0–4)
RBC # BLD AUTO: 4.95 10*6/MM3 (ref 4.14–5.8)
RBC #/AREA URNS HPF: ABNORMAL /HPF
SODIUM SERPL-SCNC: 143 MMOL/L (ref 136–145)
SP GR UR STRIP: 1.03 (ref 1–1.03)
TRIGL SERPL-MCNC: 60 MG/DL (ref 0–150)
TSH SERPL DL<=0.005 MIU/L-ACNC: 1.22 UIU/ML (ref 0.27–4.2)
UROBILINOGEN UR STRIP-MCNC: ABNORMAL MG/DL
VLDLC SERPL CALC-MCNC: 11 MG/DL (ref 5–40)
WBC # BLD AUTO: 7.59 10*3/MM3 (ref 3.4–10.8)
WBC #/AREA URNS HPF: ABNORMAL /HPF

## 2023-01-04 RX ORDER — TRAZODONE HYDROCHLORIDE 150 MG/1
150 TABLET ORAL NIGHTLY
Qty: 90 TABLET | Refills: 3 | Status: SHIPPED | OUTPATIENT
Start: 2023-01-04

## 2023-01-04 NOTE — TELEPHONE ENCOUNTER
----- Message from Amy Chapa MA sent at 1/3/2023  8:50 AM EST -----  Regarding: FW: Trazodone  Contact: [phone]  PLEASE ADVISE     ----- Message -----  From: Clarence Hoang  Sent: 1/3/2023   8:15 AM EST  To: [facility]  Subject: Trazodone                                        Happy New Year! Can you refill my trazodone please? I take 150mg per night. You prescribed me 100 mg per night but I can’t remember why we did that. Long story short I’m almost out and I’m leaving for vacation on Thursday and my refill date is February.   Thanks in advance

## 2023-01-11 ENCOUNTER — OFFICE VISIT (OUTPATIENT)
Dept: INTERNAL MEDICINE | Facility: CLINIC | Age: 53
End: 2023-01-11
Payer: MEDICAID

## 2023-01-11 VITALS
DIASTOLIC BLOOD PRESSURE: 70 MMHG | WEIGHT: 195.2 LBS | HEART RATE: 49 BPM | BODY MASS INDEX: 27.94 KG/M2 | HEIGHT: 70 IN | OXYGEN SATURATION: 98 % | SYSTOLIC BLOOD PRESSURE: 108 MMHG | TEMPERATURE: 97.7 F

## 2023-01-11 DIAGNOSIS — L98.9 SKIN LESION: ICD-10-CM

## 2023-01-11 DIAGNOSIS — Z00.00 ANNUAL PHYSICAL EXAM: Primary | ICD-10-CM

## 2023-01-11 PROCEDURE — 99396 PREV VISIT EST AGE 40-64: CPT | Performed by: NURSE PRACTITIONER

## 2023-01-11 NOTE — PROGRESS NOTES
Subjective   Clarence Hoang is a 53 y.o. male. Patient is here today for   Chief Complaint   Patient presents with   • Annual Exam   • Mole     Wants to see about possibly getting a mole removed          Vitals:    23 0956   BP: 108/70   Pulse: (!) 49   Temp: 97.7 °F (36.5 °C)   SpO2: 98%     Body mass index is 28.01 kg/m².    The following portions of the patient's history were reviewed and updated as appropriate: allergies, current medications, past family history, past medical history, past social history, past surgical history and problem list.    Past Medical History:   Diagnosis Date   • Anxiety    • Cataract    • GERD (gastroesophageal reflux disease)    • Overactive bladder       No Known Allergies   Social History     Socioeconomic History   • Marital status: Single   Tobacco Use   • Smoking status: Former     Packs/day: 2.00     Years: 30.00     Pack years: 60.00     Types: Cigarettes, Electronic Cigarette     Quit date: 2017     Years since quittin.0   • Smokeless tobacco: Current   • Tobacco comments:     vaping    Vaping Use   • Vaping Use: Every day   • Substances: Nicotine, Flavoring   • Devices: Pre-filled or refillable cartridge   Substance and Sexual Activity   • Alcohol use: Not Currently   • Drug use: Never   • Sexual activity: Yes     Partners: Male        Current Outpatient Medications:   •  ALPRAZolam (XANAX) 0.5 MG tablet, Take 1 tablet by mouth 3 (Three) Times a Day As Needed for Anxiety., Disp: 90 tablet, Rfl: 1  •  famotidine (PEPCID) 20 MG tablet, TAKE ONE TABLET BY MOUTH TWICE A DAY, Disp: 180 tablet, Rfl: 3  •  pantoprazole (PROTONIX) 40 MG EC tablet, TAKE ONE TABLET BY MOUTH DAILY, Disp: 90 tablet, Rfl: 0  •  tadalafil (CIALIS) 5 MG tablet, TAKE ONE TABLET BY MOUTH DAILY AS NEEDED FOR ERECTILE DYSFUNCTION, Disp: 90 tablet, Rfl: 0  •  traZODone (DESYREL) 150 MG tablet, Take 1 tablet by mouth Every Night., Disp: 90 tablet, Rfl: 3       Objective   History of Present  Illness   Clarence Hoang 53 y.o. male who presents for an Annual Wellness Visit.  he has a history of   Patient Active Problem List   Diagnosis   • Hiatal hernia   • Generalized anxiety disorder   • Disorder of prostate   .  he has been doing well. He take alprazolam as needed for anxiety. He is in a committed relationship now and is no longer on Truvada for PREP. holter monitor was ordered in November for palpitations but patient still has not been contacted to pick it up. Palpitations have improved    Labs results discussed in detail with the patient.  Plan to update vaccines if needed today.    Health Habits:  Dental Exam. up to date  Eye Exam. up to date  Exercise: 0 times/week.  Current exercise activities include: none    Preventative counseling  Discussed face to face the importance of healthy diet and exercise.     Lab Results (most recent)     No visits with results within 1 Month(s) from this visit.   Latest known visit with results is:   Orders Only on 12/05/2022   Component Date Value Ref Range Status   • WBC 01/04/2023 7.59  3.40 - 10.80 10*3/mm3 Final   • RBC 01/04/2023 4.95  4.14 - 5.80 10*6/mm3 Final   • Hemoglobin 01/04/2023 15.1  13.0 - 17.7 g/dL Final   • Hematocrit 01/04/2023 45.8  37.5 - 51.0 % Final   • MCV 01/04/2023 92.5  79.0 - 97.0 fL Final   • MCH 01/04/2023 30.5  26.6 - 33.0 pg Final   • MCHC 01/04/2023 33.0  31.5 - 35.7 g/dL Final   • RDW 01/04/2023 12.3  12.3 - 15.4 % Final   • Platelets 01/04/2023 226  140 - 450 10*3/mm3 Final   • Neutrophil Rel % 01/04/2023 44.0  42.7 - 76.0 % Final   • Lymphocyte Rel % 01/04/2023 48.6 (H)  19.6 - 45.3 % Final   • Monocyte Rel % 01/04/2023 7.0  5.0 - 12.0 % Final   • Eosinophil Rel % 01/04/2023 0.0 (L)  0.3 - 6.2 % Final   • Basophil Rel % 01/04/2023 0.3  0.0 - 1.5 % Final   • Neutrophils Absolute 01/04/2023 3.34  1.70 - 7.00 10*3/mm3 Final   • Lymphocytes Absolute 01/04/2023 3.69 (H)  0.70 - 3.10 10*3/mm3 Final   • Monocytes Absolute 01/04/2023  0.53  0.10 - 0.90 10*3/mm3 Final   • Eosinophils Absolute 01/04/2023 0.00  0.00 - 0.40 10*3/mm3 Final   • Basophils Absolute 01/04/2023 0.02  0.00 - 0.20 10*3/mm3 Final   • Immature Granulocyte Rel % 01/04/2023 0.1  0.0 - 0.5 % Final   • Immature Grans Absolute 01/04/2023 0.01  0.00 - 0.05 10*3/mm3 Final   • nRBC 01/04/2023 0.3 (H)  0.0 - 0.2 /100 WBC Final   • Glucose 01/04/2023 84  65 - 99 mg/dL Final   • BUN 01/04/2023 16  6 - 20 mg/dL Final   • Creatinine 01/04/2023 1.04  0.76 - 1.27 mg/dL Final   • EGFR Result 01/04/2023 86.9  >60.0 mL/min/1.73 Final    Comment: National Kidney Foundation and American Society of  Nephrology (ASN) Task Force recommended calculation based  on the Chronic Kidney Disease Epidemiology Collaboration  (CKD-EPI) equation refit without adjustment for race.  GFR Normal >60  Chronic Kidney Disease <60  Kidney Failure <15     • BUN/Creatinine Ratio 01/04/2023 15.4  7.0 - 25.0 Final   • Sodium 01/04/2023 143  136 - 145 mmol/L Final   • Potassium 01/04/2023 4.2  3.5 - 5.2 mmol/L Final   • Chloride 01/04/2023 104  98 - 107 mmol/L Final   • Total CO2 01/04/2023 28.9  22.0 - 29.0 mmol/L Final   • Calcium 01/04/2023 9.1  8.6 - 10.5 mg/dL Final   • Total Protein 01/04/2023 6.7  6.0 - 8.5 g/dL Final   • Albumin 01/04/2023 4.6  3.5 - 5.2 g/dL Final   • Globulin 01/04/2023 2.1  gm/dL Final   • A/G Ratio 01/04/2023 2.2  g/dL Final   • Total Bilirubin 01/04/2023 0.3  0.0 - 1.2 mg/dL Final   • Alkaline Phosphatase 01/04/2023 43  39 - 117 U/L Final   • AST (SGOT) 01/04/2023 21  1 - 40 U/L Final   • ALT (SGPT) 01/04/2023 22  1 - 41 U/L Final   • Total Cholesterol 01/04/2023 203 (H)  0 - 200 mg/dL Final    Comment: Cholesterol Reference Ranges  (U.S. Department of Health and Human Services ATP III  Classifications)  Desirable          <200 mg/dL  Borderline High    200-239 mg/dL  High Risk          >240 mg/dL  Triglyceride Reference Ranges  (U.S. Department of Health and Human Services ATP  III  Classifications)  Normal           <150 mg/dL  Borderline High  150-199 mg/dL  High             200-499 mg/dL  Very High        >500 mg/dL  HDL Reference Ranges  (U.S. Department of Health and Human Services ATP III  Classifications)  Low     <40 mg/dl (major risk factor for CHD)  High    >60 mg/dl ('negative' risk factor for CHD)  LDL Reference Ranges  (U.S. Department of Health and Human Services ATP III  Classifications)  Optimal          <100 mg/dL  Near Optimal     100-129 mg/dL  Borderline High  130-159 mg/dL  High             160-189 mg/dL  Very High        >189 mg/dL     • Triglycerides 01/04/2023 60  0 - 150 mg/dL Final   • HDL Cholesterol 01/04/2023 56  40 - 60 mg/dL Final   • VLDL Cholesterol Dami 01/04/2023 11  5 - 40 mg/dL Final   • LDL Chol Calc (NIH) 01/04/2023 136 (H)  0 - 100 mg/dL Final   • LDL/HDL RATIO 01/04/2023 2.41   Final   • TSH 01/04/2023 1.220  0.270 - 4.200 uIU/mL Final   • Specific Gravity, UA 01/04/2023 1.027  1.005 - 1.030 Final   • pH, UA 01/04/2023 6.0  5.0 - 8.0 Final   • Color, UA 01/04/2023 Yellow   Final    REFERENCE RANGE: Yellow, Straw   • Appearance, UA 01/04/2023 Clear  Clear Final   • Leukocytes, UA 01/04/2023 Trace (A)  Negative Final   • Protein 01/04/2023 Trace (A)  Negative Final   • Glucose, UA 01/04/2023 Negative  Negative Final   • Ketones 01/04/2023 Trace (A)  Negative Final   • Blood, UA 01/04/2023 Negative  Negative Final   • Bilirubin, UA 01/04/2023 Negative  Negative Final   • Urobilinogen, UA 01/04/2023 Comment   Final    Comment: 1.0 E.U./dL  REFERENCE RANGE: 0.2 - 1.0 E.U./dL     • Nitrite, UA 01/04/2023 Negative  Negative Final   • PSA 01/04/2023 1.730  0.000 - 4.000 ng/mL Final   • WBC, UA 01/04/2023 0-2  /HPF Final    REFERENCE RANGE: None Seen, 0-2   • RBC, UA 01/04/2023 0-2  /HPF Final    REFERENCE RANGE: None Seen, 0-2   • Epithelial Cells (non renal) 01/04/2023 0-2  /HPF Final    REFERENCE RANGE: None Seen, 0-2   • Cast Type 01/04/2023 Comment    Final    HYALINE CASTS, UA            None Seen        /LPF       None Seen  N   • Mucus, UA 01/04/2023 See below: (A)  /HPF Final    Comment: Moderate/2+  REFERENCE RANGE: None Seen, Trace     • Bacteria, UA 01/04/2023 2+ (A)  None Seen /HPF Final                 Review of Systems   Constitutional: Positive for fatigue.   HENT: Negative.    Respiratory: Negative.    Cardiovascular: Positive for palpitations (have improved ).   Gastrointestinal: Negative.    Genitourinary: Positive for frequency (when drinks caffeine ).   Musculoskeletal: Negative.    Skin:        Lesion on the top of his head    Psychiatric/Behavioral: The patient is nervous/anxious.        Physical Exam  Vitals and nursing note reviewed.   Constitutional:       General: He is not in acute distress.     Appearance: Normal appearance. He is well-developed and well-groomed.   HENT:      Head: Normocephalic.      Right Ear: Tympanic membrane and ear canal normal.      Left Ear: Tympanic membrane and ear canal normal.      Mouth/Throat:      Pharynx: Oropharynx is clear.   Eyes:      General: Lids are normal.   Neck:      Thyroid: No thyromegaly.   Cardiovascular:      Rate and Rhythm: Normal rate and regular rhythm.      Heart sounds: Normal heart sounds.   Pulmonary:      Effort: Pulmonary effort is normal.      Breath sounds: Normal breath sounds.   Abdominal:      General: Bowel sounds are normal.      Palpations: Abdomen is soft.      Tenderness: There is no abdominal tenderness.   Musculoskeletal:      Cervical back: Neck supple.   Skin:     General: Skin is warm and dry.      Comments: There is a lesion to the top of his head, that is raised, with irregular borders, mutiliple colors.    Neurological:      Mental Status: He is alert and oriented to person, place, and time.   Psychiatric:         Mood and Affect: Mood normal.         ASSESSMENT       Problems Addressed this Visit    None  Visit Diagnoses     Annual physical exam    -  Primary     Skin lesion        Relevant Orders    Ambulatory Referral to Dermatology      Diagnoses       Codes Comments    Annual physical exam    -  Primary ICD-10-CM: Z00.00  ICD-9-CM: V70.0     Skin lesion     ICD-10-CM: L98.9  ICD-9-CM: 709.9           PLAN    TC and LDL are slightly elevated, discussed dietary changes  Age and sex appropriate physical exam performed and documented. Updated past medical, family, social and surgical histories as well as allergies and care team list. Addressed care gaps listed in the medical record.   - Encouraged sunscreen use to reduce risk of skin cancer for any days with sun exposure over 20 minutes.  -Encouraged annual dental and vision exams as part of   their overall health.   -Encouraged minimum of 30 minutes or more of exercise at a brisk walk or higher 5 days per week combined with a well-balanced diet.   -Immunizations reviewed and updated in EMR. Recommend flu and covid vaccines at pharmacy  Will refer to dermatology for skin lesion   holter monitor was ordered in November and patient has not been contacted to schedule, will call and follow up on this     Return in about 1 year (around 1/11/2024) for Annual physical, with labs. or sooner if needed

## 2023-03-15 DIAGNOSIS — F41.1 GENERALIZED ANXIETY DISORDER: ICD-10-CM

## 2023-03-16 RX ORDER — ALPRAZOLAM 0.5 MG/1
TABLET ORAL
Qty: 90 TABLET | Refills: 0 | Status: SHIPPED | OUTPATIENT
Start: 2023-03-16

## 2023-03-16 RX ORDER — FAMOTIDINE 20 MG/1
TABLET, FILM COATED ORAL
Qty: 180 TABLET | Refills: 3 | Status: SHIPPED | OUTPATIENT
Start: 2023-03-16

## 2023-03-27 RX ORDER — PANTOPRAZOLE SODIUM 40 MG/1
TABLET, DELAYED RELEASE ORAL
Qty: 90 TABLET | Refills: 3 | Status: SHIPPED | OUTPATIENT
Start: 2023-03-27

## 2023-04-09 DIAGNOSIS — N42.9 DISORDER OF PROSTATE: ICD-10-CM

## 2023-04-10 RX ORDER — TADALAFIL 5 MG/1
TABLET ORAL
Qty: 90 TABLET | Refills: 0 | Status: SHIPPED | OUTPATIENT
Start: 2023-04-10

## 2023-04-26 ENCOUNTER — TELEPHONE (OUTPATIENT)
Dept: INTERNAL MEDICINE | Facility: CLINIC | Age: 53
End: 2023-04-26
Payer: MEDICAID

## 2023-04-26 DIAGNOSIS — R20.0 NUMBNESS IN BOTH HANDS: Primary | ICD-10-CM

## 2023-04-26 NOTE — TELEPHONE ENCOUNTER
"----- Message from Denise Davies MA sent at 4/25/2023 12:49 PM EDT -----  Regarding: FW: Numbness is hands  Contact: [phone]    ----- Message -----  From: Clarence Hoang \"Norberto Hoang\"  Sent: 4/24/2023   4:40 PM EDT  To: Paulina Rai [facility]  Subject: Numbness is hands                                I believe we discussed my hands going numb the last time I saw you. I’d like to see what my options, other than surgery, would be. Do I need to make an appointment with you or a specialist? Thanks and hope you are well.     "

## 2023-04-26 NOTE — TELEPHONE ENCOUNTER
Recommend seeing a hand surgeon.   I do not know who is in network with [employer] [facility]. He can call them for an appt and let me know when it is so I can placed the referral

## 2023-04-26 NOTE — TELEPHONE ENCOUNTER
"----- Message from Guy London MA sent at 4/26/2023 10:17 AM EDT -----  Regarding: FW: Numbness is hands  Contact: [phone]  PLEASE PLACE REFERRAL   ----- Message -----  From: Clarence Hoang \"Norberto Hoang\"  Sent: 4/26/2023  10:07 AM EDT  To: Paulina [facility]  Subject: Numbness is hands                                I called [facility] and they need the referral first then will call me to schedule the appointment.     "

## 2023-05-17 ENCOUNTER — OFFICE VISIT (OUTPATIENT)
Dept: INTERNAL MEDICINE | Facility: CLINIC | Age: 53
End: 2023-05-17
Payer: MEDICAID

## 2023-05-17 VITALS
OXYGEN SATURATION: 98 % | SYSTOLIC BLOOD PRESSURE: 118 MMHG | BODY MASS INDEX: 27.92 KG/M2 | HEIGHT: 70 IN | WEIGHT: 195 LBS | DIASTOLIC BLOOD PRESSURE: 64 MMHG | HEART RATE: 54 BPM

## 2023-05-17 DIAGNOSIS — Z12.2 ENCOUNTER FOR SCREENING FOR LUNG CANCER: ICD-10-CM

## 2023-05-17 DIAGNOSIS — L72.3 SEBACEOUS CYST: Primary | ICD-10-CM

## 2023-05-17 DIAGNOSIS — Z87.891 FORMER SMOKER: ICD-10-CM

## 2023-05-17 DIAGNOSIS — R41.840 DIFFICULTY CONCENTRATING: ICD-10-CM

## 2023-05-17 PROCEDURE — 1160F RVW MEDS BY RX/DR IN RCRD: CPT | Performed by: NURSE PRACTITIONER

## 2023-05-17 PROCEDURE — 99213 OFFICE O/P EST LOW 20 MIN: CPT | Performed by: NURSE PRACTITIONER

## 2023-05-17 PROCEDURE — 1159F MED LIST DOCD IN RCRD: CPT | Performed by: NURSE PRACTITIONER

## 2023-05-17 NOTE — PROGRESS NOTES
Subjective   Clarence Hoang is a 53 y.o. male.   Chief Complaint   Patient presents with   • Cyst     On left shoulder causing movement issues      Vitals:    05/17/23 1029   BP: 118/64   Pulse: 54   SpO2: 98%     No LMP for male patient.    History of Present Illness  Norberto is a 53 year old male who is here for an acute visit. He has a cyst on his left shoulder. It doesn't cause any pain. He feels like it limits his shoulder mobility.   He also has had difficulty concentrating and would like to be screened for ADHD. He is thinking about going back to school            Answers for HPI/ROS submitted by the patient on 5/17/2023  What is the primary reason for your visit?: Other  Please describe your symptoms.: Shoulder mobility maybe caused by cyst. , Also, adult ADHD  Have you had these symptoms before?: Yes  How long have you been having these symptoms?: Greater than 2 weeks  Please list any medications you are currently taking for this condition.: None        The following portions of the patient's history were reviewed and updated as appropriate: allergies, current medications, past family history, past medical history, past social history, past surgical history and problem list.    Review of Systems   Respiratory: Negative for apnea, cough and shortness of breath.    Cardiovascular: Negative for chest pain.   Skin:        Cyst    Psychiatric/Behavioral: Positive for decreased concentration.       Objective   Physical Exam  Vitals and nursing note reviewed.   Constitutional:       General: He is not in acute distress.     Appearance: Normal appearance. He is well-developed and well-groomed.   HENT:      Head: Normocephalic.   Cardiovascular:      Rate and Rhythm: Normal rate and regular rhythm.   Pulmonary:      Effort: Pulmonary effort is normal.      Breath sounds: Normal breath sounds.       Skin:     General: Skin is warm and dry.   Neurological:      Mental Status: He is alert and oriented to person, place,  and time.   Psychiatric:         Mood and Affect: Mood normal.         Assessment & Plan   Diagnoses and all orders for this visit:    1. Sebaceous cyst (Primary)  -     Ambulatory Referral to Dermatology    2. Former smoker  -     CT chest low dose wo; Future    3. Encounter for screening for lung cancer  -     CT chest low dose wo; Future    4. Difficulty concentrating  -     Ambulatory Referral to Neuropsychology      Will refer to dermatology for cyst removal. If he continues to have decreased ROM once removed will refer to PT. He denies pain  Will refer to neuropsych for ADHD testing   He is due for low dose lung cancer screening   Also recommend hepatitis b vaccine- he will have to do this at the pharmacy or health department   Follow up as needed and for continual care

## 2023-05-17 NOTE — PROGRESS NOTES
"   Low-Dose Lung Cancer CT Screening Visit    CHIEF COMPLAINT:    Shared Decision Making  I am discussing tobacco cessation today with Clarence Hoang    SMOKING HISTORY:     Social History     Tobacco Use   Smoking Status Former   • Packs/day: 2.00   • Years: 30.00   • Pack years: 60.00   • Types: Cigarettes, Electronic Cigarette   • Quit date: 2017   • Years since quittin.3   Smokeless Tobacco Current   Tobacco Comments    vaping        SUBJECTIVE:     Clarence Hoang is a former smoker quitting {NUMBERS; [id number]} years ago with a  ***  pack year history.  he reports no use of alternate forms of tobacco, electronic cigarettes, marijuana or other substances.  Based on the recommendation of the [place] Preventive Services Task Force, this patient is at high risk for lung cancer and a low-dose CT screening scan is recommended.     The patient has had no hemoptysis, unintentional weight loss or increasing shortness of breath. The patient is asymptomatic and has no signs or symptoms of lung cancer.     Together we discussed the potential benefits and potential harms of being screened for lung cancer including the potential for follow up diagnostic testing, risk for over diagnosis, false positive rate and radiation exposure using the [facility] Lung Cancer Screening Shared Decision-Making Tool. A copy of this decision aid resource has been provided in the after visit summary.  We also reviewed the patient's smoking history and counseled him on the importance and health benefits of maintaining cigarette smoking abstinence.      OBJECTIVE:    /64 (BP Location: Left arm, Patient Position: Sitting, Cuff Size: Adult)   Pulse 54   Ht 177.8 cm (70\")   Wt 88.5 kg (195 lb)   SpO2 98%   BMI 27.98 kg/m²   General: no distress, alert and oriented  Chest: Lung sounds are clear to auscultation, no wheezes, rales or rhonchi, with symmetric air entry. No respiratory distress  Cardiovascular: RRR " with no murmur auscultated      Continued Smoking Abstinence discussion:     We discussed that there are a number of resources and interventions to assist with smoking cessation if needed in the future.   On a scale of zero to ten, the patient rates their motivation to stay quit at a *** out of 10 today.  The patient is confident that they will never smoke in the future.    Recommendations for continued lung cancer screening:      We discussed the NCCN guidelines for lung cancer screening and the patient verbalized understanding that annual screening is recommended until fifteen years beyond smoking as long as they have no other disease or comorbidity that would prevent them from receiving cancer treatments such as surgery should a lung cancer be detected.  After review of the NCCN guidelines and recommendations for ongoing screening, the patient verbalized understanding of recommendations for follow-up.  The patient {[id number]} decided to proceed with a Low Dose Lung Cancer Screening CT today.       {[id number]}minutes face-to-face spent counseling patient on the continued health benefits of having quit tobacco, maintaining smoking abstinence, smoking cessation strategies and resources, as well as the importance of adherence to annual lung cancer low-dose CT screening.

## 2023-06-19 DIAGNOSIS — F41.1 GENERALIZED ANXIETY DISORDER: ICD-10-CM

## 2023-06-19 RX ORDER — ALPRAZOLAM 0.5 MG/1
TABLET ORAL
Qty: 90 TABLET | Refills: 1 | Status: SHIPPED | OUTPATIENT
Start: 2023-06-19

## 2023-07-25 ENCOUNTER — HOSPITAL ENCOUNTER (OUTPATIENT)
Dept: CT IMAGING | Facility: HOSPITAL | Age: 53
Discharge: HOME | End: 2023-07-25
Admitting: NURSE PRACTITIONER
Payer: MEDICAID

## 2023-07-25 DIAGNOSIS — Z87.891 FORMER SMOKER: ICD-10-CM

## 2023-07-25 DIAGNOSIS — Z12.2 ENCOUNTER FOR SCREENING FOR LUNG CANCER: ICD-10-CM

## 2023-07-25 PROCEDURE — 71271 CT THORAX LUNG CANCER SCR C-: CPT

## 2023-08-10 ENCOUNTER — TELEPHONE (OUTPATIENT)
Dept: INTERNAL MEDICINE | Facility: CLINIC | Age: 53
End: 2023-08-10
Payer: MEDICAID

## 2023-08-10 DIAGNOSIS — R20.0 NUMBNESS IN BOTH HANDS: Primary | ICD-10-CM

## 2023-08-10 NOTE — TELEPHONE ENCOUNTER
"----- Message from Guy London MA sent at 8/10/2023  1:13 PM EDT -----  Regarding: FW: Hand issues.   Contact: [phone]  [facility]?   ----- Message -----  From: Clarence Hoang \"Norberto Natalya\"  Sent: 8/10/2023  12:08 PM EDT  To: Paulina Rai [facility]  Subject: Hand issues.                                     Amee Guzman, hope you are well. I just left [facility]. Very disappointing. She was distracted and hurried and basically said I don't know what's wrong. Here is a shot. Is there another specialist you can refer me to please? I know they are supposed to be the best, but not today. Thanks for any help you can provide.     "

## 2023-09-29 ENCOUNTER — TELEPHONE (OUTPATIENT)
Dept: INTERNAL MEDICINE | Facility: CLINIC | Age: 53
End: 2023-09-29
Payer: MEDICAID

## 2023-09-29 NOTE — TELEPHONE ENCOUNTER
[facility] CALLED- PATIENT TRIED TO REFILL ALPRAZOLAM PRESCRIPTION BUT HAD A PRESCRIPTION BY BETTY SALGUERO (PSYCH) FOR LORAZEPAM 28 DAYS AGO.   PHARMACIST CHECKING TO SEE IF OK TO FILL.  I ADVISED TO HOLD UNTIL I SPEAK WITH PATIENT AND MARIAJOSE.  CALLED PATIENT, HE STATED HE SAW BETTY AND WAS PRESCRIBED ADD MED AND LORAZEPAM BUT IT MADE HIM FEEL AWFUL AND HE STOPPED TAKING ALL OF THAT, HE SAID HE CALLED THE PHARMACY AND TOLD THEM TO STOP THOSE PRESCRIPTIONS. HE ALSO STATED HE WILL NOT GO BACK TO THAT OFFICE.     OK TO CALL PHARMACY AND AUTHORIZE REFILL ON ALPRAZOLAM?

## 2023-09-29 NOTE — TELEPHONE ENCOUNTER
Called patient and left detailed voicemail.  If he needs a refill on Alprazolam to call back and let us know.  Megan said she will authorize a month fill and he is due for an appointment next month.     I will call [facility] and make sure all other meds are cancelled from his profile if the patient calls us back.

## 2023-09-29 NOTE — TELEPHONE ENCOUNTER
Patient returned your call. Yes her would like his Alprazolam medication refilled  He has an appointment scheduled with [facility] in October

## 2023-09-29 NOTE — TELEPHONE ENCOUNTER
Since he just had the lorazepam filled, and LOV in may, I cannot authorize alprazolam at this time.   Ever reviewed

## 2023-10-14 DIAGNOSIS — N42.9 DISORDER OF PROSTATE: ICD-10-CM

## 2023-10-16 RX ORDER — TADALAFIL 5 MG/1
5 TABLET ORAL DAILY PRN
Qty: 90 TABLET | Refills: 0 | Status: SHIPPED | OUTPATIENT
Start: 2023-10-16

## 2023-12-12 ENCOUNTER — TELEPHONE (OUTPATIENT)
Dept: INTERNAL MEDICINE | Facility: CLINIC | Age: 53
End: 2023-12-12
Payer: MEDICAID

## 2023-12-12 DIAGNOSIS — M79.643 CHRONIC HAND PAIN, UNSPECIFIED LATERALITY: Primary | ICD-10-CM

## 2023-12-12 DIAGNOSIS — G89.29 CHRONIC HAND PAIN, UNSPECIFIED LATERALITY: Primary | ICD-10-CM

## 2023-12-12 NOTE — TELEPHONE ENCOUNTER
"----- Message from Hawa Malhotra MA sent at 12/12/2023  2:58 PM EST -----  Regarding: FW: Pain in hand  Contact: [phone]    ----- Message -----  From: Clarence Hoang \"Norberto Natalya\"  Sent: 12/12/2023   2:57 PM EST  To: Paulina [facility]  Subject: Pain in hand                                     Elmer Jackson, could you please give me a referral for a physical therapist to look at my hand please. The pain is becoming unbearable at times and [facility] and [facility] didn’t see anything on the X-rays they took. Thanks and Margarita Calle!!!    " Patient lost IV access, charge nurse tried ultrasound twice both failed, call out to clinical  to retry, perfect serve to NP to see if midline would be appropriate. Will continue to monitor.  Ramo De La Cruz RN

## 2023-12-19 ENCOUNTER — TELEPHONE (OUTPATIENT)
Dept: INTERNAL MEDICINE | Facility: CLINIC | Age: 53
End: 2023-12-19

## 2023-12-19 DIAGNOSIS — M79.643 CHRONIC HAND PAIN, UNSPECIFIED LATERALITY: Primary | ICD-10-CM

## 2023-12-19 DIAGNOSIS — G89.29 CHRONIC HAND PAIN, UNSPECIFIED LATERALITY: Primary | ICD-10-CM

## 2023-12-19 NOTE — TELEPHONE ENCOUNTER
[facility] Therapy Department called and stated that they need a new order for Occupational Therapy put in.   She said the one put in on 12/12/2023 on the order says physical therapy and the speciality says Occupational Therapy but both need to say Occupational Therapy. Must be epic glitch.    Can you please re-enter order for Occupation Therapy for chronic hand pain laterality

## 2023-12-26 RX ORDER — TRAZODONE HYDROCHLORIDE 150 MG/1
150 TABLET ORAL NIGHTLY
Qty: 90 TABLET | Refills: 3 | Status: SHIPPED | OUTPATIENT
Start: 2023-12-26

## 2024-01-03 ENCOUNTER — HOSPITAL ENCOUNTER (OUTPATIENT)
Dept: OCCUPATIONAL THERAPY | Facility: HOSPITAL | Age: 54
Setting detail: THERAPIES SERIES
Discharge: HOME | End: 2024-01-03
Payer: COMMERCIAL

## 2024-01-03 DIAGNOSIS — F41.1 GENERALIZED ANXIETY DISORDER: ICD-10-CM

## 2024-01-03 DIAGNOSIS — Z12.5 SCREENING FOR PROSTATE CANCER: ICD-10-CM

## 2024-01-03 DIAGNOSIS — Z00.00 ROUTINE HEALTH MAINTENANCE: Primary | ICD-10-CM

## 2024-01-03 DIAGNOSIS — M79.641 PAIN IN BOTH HANDS: Primary | ICD-10-CM

## 2024-01-03 DIAGNOSIS — M79.642 PAIN IN BOTH HANDS: Primary | ICD-10-CM

## 2024-01-03 PROCEDURE — 97166 OT EVAL MOD COMPLEX 45 MIN: CPT

## 2024-01-03 NOTE — THERAPY EVALUATION
Outpatient Occupational Therapy Hand Initial Evaluation   [facility]     Patient Name: Clarence Hoang  : 1970  MRN: [id number]  Today's Date: 1/3/2024       Visit Date: 2024    Patient Active Problem List   Diagnosis    Hiatal hernia    Generalized anxiety disorder    Disorder of prostate        Past Medical History:   Diagnosis Date    Anxiety     Cataract     GERD (gastroesophageal reflux disease)     Overactive bladder         Past Surgical History:   Procedure Laterality Date    CATARACT EXTRACTION      COLONOSCOPY N/A 2021    Procedure: COLONOSCOPY  TO CECUM;  Surgeon: Abhi Malik MD;  Location: [facility] ENDOSCOPY;  Service: General;  Laterality: N/A;  SCREENING  --DIVERTICULOSIS     HERNIA REPAIR           Visit Dx:    ICD-10-CM ICD-9-CM   1. Pain in both hands  M79.641 729.5    M79.642            Hand Therapy (last 24 hours)       Hand Eval       Row Name 24 1000             Hand  Strength     Strength Affected Side Bilateral  -KN          Strength Right    # Reps 3  -KN      Right Rung 2  -KN      Right  Test 1 110  -KN      Right  Test 2 110  -KN      Right  Test 3 110  -KN       Strength Average Right 110  -KN          Strength Left    # Reps 3  -KN      Left Rung 2  -KN      Left  Test 1 80  -KN      Left  Test 2 85  -KN      Left  Test 3 85  -KN       Strength Average Left 83.33  -KN         Pinch Strength    Affected Side Right  -KN         Right Hand Strength - Pinch (lbs)    Lateral 8 lbs  -KN      Tip (2 point) 5 lbs  -KN      Tip Pinch - Middle Finger 12 lbs  -KN         Left Hand Strength - Pinch (lbs)    Lateral 12 lbs  -KN      Tip (2 point) 9 lbs  -KN      Tip Pinch - Middle Finger 4 lbs  -KN         Therapy Education    Education Details OT Eval results; OT POC  -KN      Given Symptoms/condition management;Pain management;HEP  -KN      Program New  -KN      How Provided Verbal  -KN      Provided to Patient  -KN       Level of Understanding Verbalized  -KN                User Key  (r) = Recorded By, (t) = Taken By, (c) = Cosigned By      Initials Name Provider Type    Charles Cabrera OT Occupational Therapist                   OT Ortho       Row Name 01/03/24 1000             Subjective Pain    Able to rate subjective pain? yes  -KN      Pre-Treatment Pain Level 0  -KN      Post-Treatment Pain Level 0  -KN      Subjective Pain Comment Pt is not having pain in hands this session. However he feels that his shots are mack off  -KN         Sensation    Sensation WNL? WFL  -KN      Light Touch No apparent deficits  -KN      Sharp/Dull No apparent deficits  -KN      Monofilaments Tested? Green;Blue;Purple;Red  -KN      Green Monofilament Interpretation Normal  -KN      Blue Monofilament Interpretation Normal  -KN      Purple Monofilament Interpretation Normal  -KN      Red Monofilament Interpretation Normal  -KN         General ROM    RT Upper Ext --  WFL  -KN      LT Upper Ext --  WFL  -KN      Right Hand --  WFL  -KN      Left Hand --  WFL  -KN         MMT (Manual Muscle Testing)    Rt Upper Ext Rt Shoulder WFL;Rt Scapula WFL;Rt Elbow/Forearm WFL;Rt Wrist WFL  -KN      Lt Upper Ext Lt Shoulder WFL;Lt Scapula WFL;Lt Wrist Flexion;Lt Wrist Extension;Lt Elbow/Forearm WFL  -KN         MMT Left Upper Ext    Lt Wrist Flexion MMT, Gross Movement (3+/5) fair plus  -KN      Lt Wrist Extension MMT, Gross Movement (3+/5) fair plus  -KN                User Key  (r) = Recorded By, (t) = Taken By, (c) = Cosigned By      Initials Name Provider Type    Charles Cabrera OT Occupational Therapist                          Therapy Education  Education Details: OT Eval results; OT POC  Given: Symptoms/condition management, Pain management, HEP  Program: New  How Provided: Verbal  Provided to: Patient  Level of Understanding: Verbalized       OT Goals       Row Name 01/03/24 1000          OT Short Term Goals    STG 1 Pt. to increase L lateral middle  finger pinch to 6 to increase independence with grasp tasks.  -KN        Long Term Goals    LTG 1 Pt. to report pain of 2/10 or less in affected extremity with ROM.  -KN     LTG 2 Patient to improve fine motor skills evidenced by increased independence with manipulation of self care items. (i.e. buttons, tying, and opening containers) through verbal report and as evidence by 9 hole and box and blocks assessments.  -KN     LTG 3 Pt. to increase L wrist strength to 4/5 to increase independence with self care tasks.  -KN               User Key  (r) = Recorded By, (t) = Taken By, (c) = Cosigned By      Initials Name Provider Type    Charles Cabrera, OT Occupational Therapist                     OT Assessment/Plan       Row Name 01/03/24 1022          OT Assessment    Functional Limitations Performance in self-care ADL;Performance in leisure activities;Limitations in functional capacity and performance;Limitation in home management  -KN     Impairments Sensation;Muscle strength;Pain  -KN     Assessment Comments Pt is a 53 year old male being seen today for OP OT addressing bilateral hand pain. Pt reports that he went and saw [facility] hand specialist back in September and he did not have a good experience. Pt does report he recieved cortisone shots in both of his wrist for what is believed to be Carpal Tunnel. Pt reports this completely resolved all of his pain but he now feels the pain is starting to come back. Pt reports that the pain limits him at work, in the gym, and when perform ADLs such as; brushing teeth and holding utensils. Pt currently does not have any pain at rest but when he loves his L middle finger he can feel the pain coming on. The biggest issue noted today is pt has decreased L hand  strength in comparison to his R hand. During todays sessions I performed the Tinels Test on both wrist and both were negative however the cortisone shots could be masking the symptoms. Pt will benefit from skilled OT  services to address L hand function and pain to return to normal every day life task.  -KN     Please refer to paper survey for additional self-reported information Yes  -KN     OT Rehab Potential Good  -KN     Patient/caregiver participated in establishment of treatment plan and goals Yes  -KN     Patient would benefit from skilled therapy intervention Yes  -KN        OT Plan    OT Frequency 1x/week  -KN     Predicted Duration of Therapy Intervention (OT) 5 weeks  -KN     Planned CPT's? OT EVAL MOD COMPLEXITY: 80776;OT RE-EVAL: 96439;OT SELF CARE/MGMT/TRAIN 15 MIN: 64610;OT THER PROC EA 15 MIN: 80444ZA;OT THER ACT EA 15 MIN: 12555GF;OT HOT/COLD PACK;OT ULTRASOUND EA 15 MIN: 08284;OT ELECTRIC STIM ATTD EA 15 MIN: 68778;OT CARE PLAN EA 15 MIN;OT THER SUPP EA 15 MIN:  -KN     Planned Therapy Interventions (Optional Details) home exercise program;stretching;strengthening;ROM (Range of Motion);patient/family education  -KN               User Key  (r) = Recorded By, (t) = Taken By, (c) = Cosigned By      Initials Name Provider Type    Charles Cabrera OT Occupational Therapist                        Outcome Measure Options: 9 Hole Peg, Box and Blocks, Quick DASH  9 Hole Peg  9-Hole Peg Left: 16  9-Hole Peg Right: 17  Box and Blocks  Box and Blocks Left: 84  Box and Blocks Right: 83  Quick DASH  Open a tight or new jar.: Moderate Difficulty  Do heavy household chores (e.g., wash walls, wash floors): Mild Difficulty  Carry a shopping bag or briefcase: No Difficulty  Wash your back: Mild Difficulty  Use a knife to cut food: Moderate Difficulty  Recreational activities in which you take some force or impact through your arm, should or hand (e.g. golf, hammering, tennis, etc.): Moderate Difficulty  During the past week, to what extent has your arm, shoulder, or hand problem interfered with your normal social activites with family, friends, neighbors or groups?: Not at all  During the past week, were you limited in your work  or other regular daily activities as a result of your arm, shoulder or hand problem?: Slightly Limited  Arm, Shoulder, or hand pain: Severe  Tingling (pins and needles) in your arm, shoulder, or hand: Severe  During the past week, how much difficulty have you had sleeping because of the pain in your arm, shoulder or hand?: Moderate Difficiculty  Number of Questions Answered: 11  Quick DASH Score: 38.64  Work Module (Optional)  Using your usual technique for your work?: Moderate Difficulty  Doing your usual work because of arm, shoulder or hand pain?: Moderate Difficulty  Doing your work as well as you would like?: Moderate Difficulty  Spending your usual amount of time doing your work?: Mild Difficulty  Work Module Score: 43.75         Time Calculation:   OT Start Time: 0845  OT Stop Time: 0930  OT Time Calculation (min): 45 min  Untimed Charges  OT Eval/Re-eval Minutes: 45  OT Moist Heat Minutes: 5  Total Minutes  Untimed Charges Total Minutes: 45   Total Minutes: 45     Therapy Charges for Today       Code Description Service Date Service Provider Modifiers Qty    31215654459  OT EVAL MOD COMPLEXITY 3 1/3/2024 Charles Rowley OT GO 1                   Charles Rowley OT  1/3/2024

## 2024-01-04 RX ORDER — ALPRAZOLAM 0.5 MG/1
TABLET ORAL
Qty: 90 TABLET | Refills: 0 | Status: SHIPPED | OUTPATIENT
Start: 2024-01-04

## 2024-01-09 ENCOUNTER — HOSPITAL ENCOUNTER (OUTPATIENT)
Dept: OCCUPATIONAL THERAPY | Facility: HOSPITAL | Age: 54
Setting detail: THERAPIES SERIES
Discharge: HOME | End: 2024-01-09
Payer: COMMERCIAL

## 2024-01-09 DIAGNOSIS — M79.642 PAIN IN BOTH HANDS: Primary | ICD-10-CM

## 2024-01-09 DIAGNOSIS — M79.641 PAIN IN BOTH HANDS: Primary | ICD-10-CM

## 2024-01-09 LAB
ALBUMIN SERPL-MCNC: 4.8 G/DL (ref 3.5–5.2)
ALBUMIN/GLOB SERPL: 2.3 G/DL
ALP SERPL-CCNC: 39 U/L (ref 39–117)
ALT SERPL-CCNC: 30 U/L (ref 1–41)
APPEARANCE UR: CLEAR
AST SERPL-CCNC: 22 U/L (ref 1–40)
BACTERIA #/AREA URNS HPF: ABNORMAL /HPF
BASOPHILS # BLD AUTO: 0.03 10*3/MM3 (ref 0–0.2)
BASOPHILS NFR BLD AUTO: 0.4 % (ref 0–1.5)
BILIRUB SERPL-MCNC: 0.6 MG/DL (ref 0–1.2)
BILIRUB UR QL STRIP: NEGATIVE
BUN SERPL-MCNC: 17 MG/DL (ref 6–20)
BUN/CREAT SERPL: 17.9 (ref 7–25)
CALCIUM SERPL-MCNC: 10 MG/DL (ref 8.6–10.5)
CHLORIDE SERPL-SCNC: 102 MMOL/L (ref 98–107)
CHOLEST SERPL-MCNC: 210 MG/DL (ref 0–200)
CO2 SERPL-SCNC: 28.4 MMOL/L (ref 22–29)
COLOR UR: YELLOW
CREAT SERPL-MCNC: 0.95 MG/DL (ref 0.76–1.27)
EGFRCR SERPLBLD CKD-EPI 2021: 95.7 ML/MIN/1.73
EOSINOPHIL # BLD AUTO: 0.17 10*3/MM3 (ref 0–0.4)
EOSINOPHIL NFR BLD AUTO: 2.2 % (ref 0.3–6.2)
EPI CELLS #/AREA URNS HPF: ABNORMAL /HPF
ERYTHROCYTE [DISTWIDTH] IN BLOOD BY AUTOMATED COUNT: 12.6 % (ref 12.3–15.4)
GLOBULIN SER CALC-MCNC: 2.1 GM/DL
GLUCOSE SERPL-MCNC: 95 MG/DL (ref 65–99)
GLUCOSE UR QL STRIP: NEGATIVE
HCT VFR BLD AUTO: 45.4 % (ref 37.5–51)
HDLC SERPL-MCNC: 64 MG/DL (ref 40–60)
HGB BLD-MCNC: 15.3 G/DL (ref 13–17.7)
HGB UR QL STRIP: NEGATIVE
IMM GRANULOCYTES # BLD AUTO: 0.02 10*3/MM3 (ref 0–0.05)
IMM GRANULOCYTES NFR BLD AUTO: 0.3 % (ref 0–0.5)
KETONES UR QL STRIP: NEGATIVE
LDLC SERPL CALC-MCNC: 134 MG/DL (ref 0–100)
LDLC/HDLC SERPL: 2.08 {RATIO}
LEUKOCYTE ESTERASE UR QL STRIP: NEGATIVE
LYMPHOCYTES # BLD AUTO: 3.22 10*3/MM3 (ref 0.7–3.1)
LYMPHOCYTES NFR BLD AUTO: 41 % (ref 19.6–45.3)
MCH RBC QN AUTO: 31 PG (ref 26.6–33)
MCHC RBC AUTO-ENTMCNC: 33.7 G/DL (ref 31.5–35.7)
MCV RBC AUTO: 92.1 FL (ref 79–97)
MONOCYTES # BLD AUTO: 0.56 10*3/MM3 (ref 0.1–0.9)
MONOCYTES NFR BLD AUTO: 7.1 % (ref 5–12)
NEUTROPHILS # BLD AUTO: 3.86 10*3/MM3 (ref 1.7–7)
NEUTROPHILS NFR BLD AUTO: 49 % (ref 42.7–76)
NITRITE UR QL STRIP: NEGATIVE
NRBC BLD AUTO-RTO: 0 /100 WBC (ref 0–0.2)
PH UR STRIP: 6 [PH] (ref 5–8)
PLATELET # BLD AUTO: 227 10*3/MM3 (ref 140–450)
POTASSIUM SERPL-SCNC: 4.4 MMOL/L (ref 3.5–5.2)
PROT SERPL-MCNC: 6.9 G/DL (ref 6–8.5)
PROT UR QL STRIP: NEGATIVE
PSA SERPL-MCNC: 3.12 NG/ML (ref 0–4)
RBC # BLD AUTO: 4.93 10*6/MM3 (ref 4.14–5.8)
RBC #/AREA URNS HPF: ABNORMAL /HPF
SODIUM SERPL-SCNC: 140 MMOL/L (ref 136–145)
SP GR UR STRIP: 1.02 (ref 1–1.03)
TRIGL SERPL-MCNC: 66 MG/DL (ref 0–150)
TSH SERPL DL<=0.005 MIU/L-ACNC: 1.28 UIU/ML (ref 0.27–4.2)
UROBILINOGEN UR STRIP-MCNC: NORMAL MG/DL
VLDLC SERPL CALC-MCNC: 12 MG/DL (ref 5–40)
WBC # BLD AUTO: 7.86 10*3/MM3 (ref 3.4–10.8)
WBC #/AREA URNS HPF: ABNORMAL /HPF

## 2024-01-09 PROCEDURE — 97110 THERAPEUTIC EXERCISES: CPT

## 2024-01-09 NOTE — THERAPY TREATMENT NOTE
Outpatient Occupational Therapy Hand Treatment Note  [facility]     Patient Name: Clarence Hoang  : 1970  MRN: [id number]  Today's Date: 2024         Visit Date: 2024  Patient Active Problem List   Diagnosis    Hiatal hernia    Generalized anxiety disorder    Disorder of prostate         Visit Dx:    ICD-10-CM ICD-9-CM   1. Pain in both hands  M79.641 729.5    M79.642                     OT Assessment/Plan       Row Name 24 0927          OT Assessment    Assessment Comments Pt seen today following initial evaluation last week. Pt reports 2/10 pain during todays session. Moist heat and ultrasound performed during todays session. See modalities tab for info. Pt given tputty exericses for hand strength Pt tolerated todays session well. Cont OT POC/  -KN               User Key  (r) = Recorded By, (t) = Taken By, (c) = Cosigned By      Initials Name Provider Type    Charles Cabrera, OTTO Occupational Therapist                     Modalities       Row Name 24 0700             Moist Heat    MH Applied Yes  -KN      Location L hand  -KN      OT Moist Heat Minutes 10  -KN      MH Prior to Rx Yes  -KN      MH S/P Rx No  -KN      Patient denies application of MH No  -KN         Ultrasound 61123    Location L palmar side of wrist  -KN      Duty Cycle 100  -KN      Frequency 1.0 MHz  -KN      Intensity - Wts/cm 1  -KN      48887 - OT Ultrasound Minutes 15  w/ warming  -KN                User Key  (r) = Recorded By, (t) = Taken By, (c) = Cosigned By      Initials Name Provider Type    Charles Cabrera OT Occupational Therapist                   OT Exercises       Row Name 24 0900             Exercise 1    Exercise Name 1 ultrasound to L wrist  -KN      Time (Minutes) 1 15  -KN         Exercise 2    Exercise Name 2 PROM to L wrist and digits in prep for therapy and to reduce pain during treatment  -KN      Cueing 2 Verbal;Tactile  -KN         Exercise 3    Exercise Name 3 graded clothes  pins using L hand switching opposition to increase pinch strength  -LOU      Cueing 3 Verbal;Demo  -KN                User Key  (r) = Recorded By, (t) = Taken By, (c) = Cosigned By      Initials Name Provider Type    Charles Cabrera OT Occupational Therapist                        Therapy Education  Education Details: tputty HEP  Given: HEP  Program: New  How Provided: Verbal  Provided to: Patient  Level of Understanding: Verbalized, Demonstrated               Time Calculation:   OT Start Time: 0845  OT Stop Time: 0930  OT Time Calculation (min): 45 min  Timed Charges  22685 - OT Ultrasound Minutes: 15 (w/ warming)  24203 - OT Therapeutic Exercise Minutes: 45  Untimed Charges  OT Moist Heat Minutes: 10  Total Minutes  Timed Charges Total Minutes: 45  Untimed Charges Total Minutes: 10   Total Minutes: 45     Therapy Charges for Today       Code Description Service Date Service Provider Modifiers Qty    77092512807 HC OT THER PROC EA 15 MIN 1/9/2024 Charles Rowley OT GO 3                    Charles Rwoley OT  1/9/2024

## 2024-01-10 DIAGNOSIS — N42.9 DISORDER OF PROSTATE: ICD-10-CM

## 2024-01-11 RX ORDER — TADALAFIL 5 MG/1
5 TABLET ORAL DAILY PRN
Qty: 90 TABLET | Refills: 0 | Status: SHIPPED | OUTPATIENT
Start: 2024-01-11

## 2024-01-16 ENCOUNTER — APPOINTMENT (OUTPATIENT)
Dept: OCCUPATIONAL THERAPY | Facility: HOSPITAL | Age: 54
End: 2024-01-16
Payer: COMMERCIAL

## 2024-01-23 ENCOUNTER — APPOINTMENT (OUTPATIENT)
Dept: OCCUPATIONAL THERAPY | Facility: HOSPITAL | Age: 54
End: 2024-01-23
Payer: COMMERCIAL

## 2024-01-30 ENCOUNTER — APPOINTMENT (OUTPATIENT)
Dept: OCCUPATIONAL THERAPY | Facility: HOSPITAL | Age: 54
End: 2024-01-30
Payer: COMMERCIAL

## 2024-03-07 ENCOUNTER — OFFICE VISIT (OUTPATIENT)
Dept: INTERNAL MEDICINE | Facility: CLINIC | Age: 54
End: 2024-03-07
Payer: COMMERCIAL

## 2024-03-07 VITALS
OXYGEN SATURATION: 98 % | HEART RATE: 82 BPM | DIASTOLIC BLOOD PRESSURE: 60 MMHG | TEMPERATURE: 97.1 F | HEIGHT: 70 IN | RESPIRATION RATE: 16 BRPM | SYSTOLIC BLOOD PRESSURE: 126 MMHG | BODY MASS INDEX: 27.92 KG/M2 | WEIGHT: 195 LBS

## 2024-03-07 DIAGNOSIS — F17.200 TOBACCO USE DISORDER: ICD-10-CM

## 2024-03-07 DIAGNOSIS — Z00.00 ANNUAL PHYSICAL EXAM: Primary | ICD-10-CM

## 2024-03-07 DIAGNOSIS — Z79.899 HIGH RISK MEDICATION USE: ICD-10-CM

## 2024-03-07 DIAGNOSIS — F41.1 GENERALIZED ANXIETY DISORDER: ICD-10-CM

## 2024-03-07 DIAGNOSIS — Z12.2 SCREENING FOR LUNG CANCER: ICD-10-CM

## 2024-03-07 NOTE — PROGRESS NOTES
Subjective   Clarence Hoang is a 54 y.o. male. Patient is here today for   Chief Complaint   Patient presents with    Annual Exam    Anxiety          Vitals:    24 1441   BP: 126/60   Pulse: 82   Resp: 16   Temp: 97.1 °F (36.2 °C)   SpO2: 98%     Body mass index is 27.98 kg/m².    The following portions of the patient's history were reviewed and updated as appropriate: allergies, current medications, past family history, past medical history, past social history, past surgical history and problem list.    Past Medical History:   Diagnosis Date    Anxiety     Cataract     GERD (gastroesophageal reflux disease)     Overactive bladder       No Known Allergies   Social History     Socioeconomic History    Marital status: Single   Tobacco Use    Smoking status: Former     Current packs/day: 0.00     Average packs/day: 2.0 packs/day for 30.0 years (60.0 ttl pk-yrs)     Types: Cigarettes, Electronic Cigarette     Start date: 1987     Quit date: 2017     Years since quittin.1    Smokeless tobacco: Current    Tobacco comments:     vaping    Vaping Use    Vaping status: Every Day    Substances: Nicotine, Flavoring    Devices: Pre-filled or refillable cartridge   Substance and Sexual Activity    Alcohol use: Not Currently    Drug use: Never    Sexual activity: Yes     Partners: Male     Birth control/protection: Same-sex partner        Current Outpatient Medications:     ALPRAZolam (XANAX) 0.5 MG tablet, TAKE ONE TABLET BY MOUTH THREE TIMES A DAY AS NEEDED FOR ANXIETY, Disp: 90 tablet, Rfl: 0    famotidine (PEPCID) 20 MG tablet, TAKE ONE TABLET BY MOUTH TWICE A DAY, Disp: 180 tablet, Rfl: 3    pantoprazole (PROTONIX) 40 MG EC tablet, TAKE ONE TABLET BY MOUTH DAILY, Disp: 90 tablet, Rfl: 3    tadalafil (CIALIS) 5 MG tablet, TAKE 1 TABLET BY MOUTH DAILY AS NEEDED FOR ERECTILE DYSFUNCTION, Disp: 90 tablet, Rfl: 0    traZODone (DESYREL) 150 MG tablet, TAKE ONE TABLET BY MOUTH ONCE NIGHTLY, Disp: 90 tablet,  Rfl: 3       Objective   History of Present Illness   Clarence Hoang 54 y.o. male who presents for an Annual physical exam and for a follow up for anxiety. He is no longer seeing his psychiatrist and takes xanax as needed. I refilled his last rx for him. He has been taking it for years as needed and it works well for him. Psychiatry attempted to put him on medications for ADHD which he could not tolerate. He overall feels well and has no complaints   Lab results discussed in detail with the patient.  Plan to update vaccines if needed today.    Health Habits:  Dental Exam. up to date  Eye Exam. not up to date - .  Exercise: 5 times/week.  Current exercise activities include: cardiovascular workout on exercise equipment    Preventative counseling  Discussed face to face the importance of healthy diet and exercise.     Lab Results (most recent)       No visits with results within 1 Month(s) from this visit.   Latest known visit with results is:   Orders Only on 01/03/2024   Component Date Value Ref Range Status    Glucose 01/09/2024 95  65 - 99 mg/dL Final    BUN 01/09/2024 17  6 - 20 mg/dL Final    Creatinine 01/09/2024 0.95  0.76 - 1.27 mg/dL Final    EGFR Result 01/09/2024 95.7  >60.0 mL/min/1.73 Final    Comment: GFR Normal >60  Chronic Kidney Disease <60  Kidney Failure <15      BUN/Creatinine Ratio 01/09/2024 17.9  7.0 - 25.0 Final    Sodium 01/09/2024 140  136 - 145 mmol/L Final    Potassium 01/09/2024 4.4  3.5 - 5.2 mmol/L Final    Chloride 01/09/2024 102  98 - 107 mmol/L Final    Total CO2 01/09/2024 28.4  22.0 - 29.0 mmol/L Final    Calcium 01/09/2024 10.0  8.6 - 10.5 mg/dL Final    Total Protein 01/09/2024 6.9  6.0 - 8.5 g/dL Final    Albumin 01/09/2024 4.8  3.5 - 5.2 g/dL Final    Globulin 01/09/2024 2.1  gm/dL Final    A/G Ratio 01/09/2024 2.3  g/dL Final    Total Bilirubin 01/09/2024 0.6  0.0 - 1.2 mg/dL Final    Alkaline Phosphatase 01/09/2024 39  39 - 117 U/L Final    AST (SGOT) 01/09/2024 22  1 -  40 U/L Final    ALT (SGPT) 01/09/2024 30  1 - 41 U/L Final    Total Cholesterol 01/09/2024 210 (H)  0 - 200 mg/dL Final    Comment: Cholesterol Reference Ranges  (U.S. Department of Health and Human Services ATP III  Classifications)  Desirable          <200 mg/dL  Borderline High    200-239 mg/dL  High Risk          >240 mg/dL  Triglyceride Reference Ranges  (U.S. Department of Health and Human Services ATP III  Classifications)  Normal           <150 mg/dL  Borderline High  150-199 mg/dL  High             200-499 mg/dL  Very High        >500 mg/dL  HDL Reference Ranges  (U.S. Department of Health and Human Services ATP III  Classifications)  Low     <40 mg/dl (major risk factor for CHD)  High    >60 mg/dl ('negative' risk factor for CHD)  LDL Reference Ranges  (U.S. Department of Health and Human Services ATP III  Classifications)  Optimal          <100 mg/dL  Near Optimal     100-129 mg/dL  Borderline High  130-159 mg/dL  High             160-189 mg/dL  Very High        >189 mg/dL      Triglycerides 01/09/2024 66  0 - 150 mg/dL Final    HDL Cholesterol 01/09/2024 64 (H)  40 - 60 mg/dL Final    VLDL Cholesterol Dami 01/09/2024 12  5 - 40 mg/dL Final    LDL Chol Calc (NIH) 01/09/2024 134 (H)  0 - 100 mg/dL Final    LDL/HDL RATIO 01/09/2024 2.08   Final    Specific Gravity, UA 01/09/2024 1.022  1.005 - 1.030 Final    pH, UA 01/09/2024 6.0  5.0 - 8.0 Final    Color, UA 01/09/2024 Yellow   Final    REFERENCE RANGE: Yellow, Straw    Appearance, UA 01/09/2024 Clear  Clear Final    Leukocytes, UA 01/09/2024 Negative  Negative Final    Protein 01/09/2024 Negative  Negative Final    Glucose, UA 01/09/2024 Negative  Negative Final    Ketones 01/09/2024 Negative  Negative Final    Blood, UA 01/09/2024 Negative  Negative Final    Bilirubin, UA 01/09/2024 Negative  Negative Final    Urobilinogen, UA 01/09/2024 Comment   Final    Comment: 0.2 E.U./dL  REFERENCE RANGE: 0.2 - 1.0 E.U./dL      Nitrite, UA 01/09/2024 Negative   Negative Final    PSA 01/09/2024 3.120  0.000 - 4.000 ng/mL Final    Comment: Testing Method: Roche [employer] Electrochemiluminescence  Immunoassay(ECLIA)  Values obtained with different assay methods or kits cannot  be used interchangeably.      WBC 01/09/2024 7.86  3.40 - 10.80 10*3/mm3 Final    RBC 01/09/2024 4.93  4.14 - 5.80 10*6/mm3 Final    Hemoglobin 01/09/2024 15.3  13.0 - 17.7 g/dL Final    Hematocrit 01/09/2024 45.4  37.5 - 51.0 % Final    MCV 01/09/2024 92.1  79.0 - 97.0 fL Final    MCH 01/09/2024 31.0  26.6 - 33.0 pg Final    MCHC 01/09/2024 33.7  31.5 - 35.7 g/dL Final    RDW 01/09/2024 12.6  12.3 - 15.4 % Final    Platelets 01/09/2024 227  140 - 450 10*3/mm3 Final    Neutrophil Rel % 01/09/2024 49.0  42.7 - 76.0 % Final    Lymphocyte Rel % 01/09/2024 41.0  19.6 - 45.3 % Final    Monocyte Rel % 01/09/2024 7.1  5.0 - 12.0 % Final    Eosinophil Rel % 01/09/2024 2.2  0.3 - 6.2 % Final    Basophil Rel % 01/09/2024 0.4  0.0 - 1.5 % Final    Neutrophils Absolute 01/09/2024 3.86  1.70 - 7.00 10*3/mm3 Final    Lymphocytes Absolute 01/09/2024 3.22 (H)  0.70 - 3.10 10*3/mm3 Final    Monocytes Absolute 01/09/2024 0.56  0.10 - 0.90 10*3/mm3 Final    Eosinophils Absolute 01/09/2024 0.17  0.00 - 0.40 10*3/mm3 Final    Basophils Absolute 01/09/2024 0.03  0.00 - 0.20 10*3/mm3 Final    Immature Granulocyte Rel % 01/09/2024 0.3  0.0 - 0.5 % Final    Immature Grans Absolute 01/09/2024 0.02  0.00 - 0.05 10*3/mm3 Final    nRBC 01/09/2024 0.0  0.0 - 0.2 /100 WBC Final    TSH 01/09/2024 1.280  0.270 - 4.200 uIU/mL Final    WBC, UA 01/09/2024 0-2  /HPF Final    REFERENCE RANGE: None Seen, 0-2    RBC, UA 01/09/2024 0-2  /HPF Final    REFERENCE RANGE: None Seen, 0-2    Epithelial Cells (non renal) 01/09/2024 0-2  /HPF Final    REFERENCE RANGE: None Seen, 0-2    Bacteria, UA 01/09/2024 Trace (A)  None Seen /HPF Final               The 10-year ASCVD risk score (Jessi GROVE, et al., 2019) is: 3.8%    Values used to calculate the  score:      Age: 54 years      Sex: Male      Is Non- : No      Diabetic: No      Tobacco smoker: No      Systolic Blood Pressure: 126 mmHg      Is BP treated: No      HDL Cholesterol: 64 mg/dL      Total Cholesterol: 210 mg/dL      Review of Systems   Constitutional:  Negative for fatigue.   HENT: Negative.     Respiratory: Negative.     Cardiovascular: Negative.    Gastrointestinal:  Negative for abdominal pain.   Genitourinary:  Negative for dysuria, frequency and urgency.        Denies risk for STD    Musculoskeletal: Negative.    Psychiatric/Behavioral:  Positive for decreased concentration. Negative for dysphoric mood. The patient is nervous/anxious.        Physical Exam  Vitals and nursing note reviewed.   Constitutional:       General: He is not in acute distress.     Appearance: Normal appearance. He is well-developed and well-groomed.   HENT:      Head: Normocephalic.      Right Ear: Tympanic membrane and ear canal normal.      Left Ear: Tympanic membrane and ear canal normal.   Eyes:      General: Lids are normal.      Conjunctiva/sclera: Conjunctivae normal.   Neck:      Thyroid: No thyromegaly.   Cardiovascular:      Rate and Rhythm: Normal rate and regular rhythm.      Heart sounds: Normal heart sounds.   Pulmonary:      Effort: Pulmonary effort is normal.      Breath sounds: Normal breath sounds.   Abdominal:      General: Bowel sounds are increased.      Tenderness: There is no abdominal tenderness.   Musculoskeletal:      Cervical back: Neck supple.   Skin:     General: Skin is warm and dry.   Neurological:      Mental Status: He is alert and oriented to person, place, and time.         ASSESSMENT       Problems Addressed this Visit       Generalized anxiety disorder     Other Visit Diagnoses       Annual physical exam    -  Primary    High risk medication use        Relevant Orders    Urine Drug Screen - Urine, Clean Catch    Tobacco use disorder        Relevant Orders    CT  Chest Low Dose Wo    Screening for lung cancer        Relevant Orders    CT Chest Low Dose Wo          Diagnoses         Codes Comments    Annual physical exam    -  Primary ICD-10-CM: Z00.00  ICD-9-CM: V70.0     High risk medication use     ICD-10-CM: Z79.899  ICD-9-CM: V58.69     Tobacco use disorder     ICD-10-CM: F17.200  ICD-9-CM: 305.1     Screening for lung cancer     ICD-10-CM: Z12.2  ICD-9-CM: V76.0     Generalized anxiety disorder     ICD-10-CM: F41.1  ICD-9-CM: 300.02             PLAN    TC and LDL are slightly elevated  10 year ASCVD risk is 3.8% for now recommend Continuing lifestyle modifications for high cholesterol. Decrease/eliminate soda, caffeine, alcohol and overall caloric intake. Reduce carbohydrates and sweets in diet.  Continue to improve dietary habits with lean proteins, fresh vegetables, fruits, and nuts. Improve aerobic exercise: walking/biking/swimming daily as tolerated, recommend 30 minutes/day at least.  2. Anxiety- he can continue alprazolam as needed. UDS obtained, elizabeth reviewed and contract updated   3. Low dose lung cancer screening ordered, due in July,  4. Age and sex appropriate physical exam performed and documented. Updated past medical, family, social and surgical histories as well as allergies and care team list. Addressed care gaps listed in the medical record.   -Encouraged annual dental and vision exams as part of their overall health.   -Encouraged  exercise  combined with a well-balanced diet.   -Immunizations reviewed and updated in EMR.            Return in about 6 months (around 9/7/2024) for med check .  Patient was given instructions and counseling regarding his  condition for health maintenance advice.  Please see specific information pulled into the AVS if appropriate.

## 2024-03-09 LAB
AMPHETAMINES UR QL SCN: NEGATIVE NG/ML
BARBITURATES UR QL SCN: NEGATIVE NG/ML
BENZODIAZ UR QL SCN: POSITIVE NG/ML
BZE UR QL SCN: NEGATIVE NG/ML
CANNABINOIDS UR QL SCN: POSITIVE NG/ML
CREAT UR-MCNC: 107 MG/DL (ref 20–300)
LABORATORY COMMENT REPORT: ABNORMAL
METHADONE UR QL SCN: NEGATIVE NG/ML
OPIATES UR QL SCN: NEGATIVE NG/ML
OXYCODONE+OXYMORPHONE UR QL SCN: NEGATIVE NG/ML
PCP UR QL: NEGATIVE NG/ML
PH UR: 5.8 [PH] (ref 4.5–8.9)
PROPOXYPH UR QL SCN: NEGATIVE NG/ML

## 2024-03-12 RX ORDER — PANTOPRAZOLE SODIUM 40 MG/1
TABLET, DELAYED RELEASE ORAL
Qty: 90 TABLET | Refills: 3 | Status: SHIPPED | OUTPATIENT
Start: 2024-03-12

## 2024-03-27 DIAGNOSIS — F41.1 GENERALIZED ANXIETY DISORDER: ICD-10-CM

## 2024-03-28 RX ORDER — ALPRAZOLAM 0.5 MG/1
TABLET ORAL
Qty: 90 TABLET | Refills: 0 | Status: SHIPPED | OUTPATIENT
Start: 2024-03-28

## 2024-04-17 DIAGNOSIS — N42.9 DISORDER OF PROSTATE: ICD-10-CM

## 2024-04-17 RX ORDER — TADALAFIL 5 MG/1
5 TABLET ORAL DAILY PRN
Qty: 90 TABLET | Refills: 0 | Status: SHIPPED | OUTPATIENT
Start: 2024-04-17

## 2024-05-22 RX ORDER — FAMOTIDINE 20 MG/1
TABLET, FILM COATED ORAL
Qty: 180 TABLET | Refills: 3 | Status: SHIPPED | OUTPATIENT
Start: 2024-05-22

## 2024-06-21 DIAGNOSIS — F41.1 GENERALIZED ANXIETY DISORDER: ICD-10-CM

## 2024-06-21 RX ORDER — ALPRAZOLAM 0.5 MG/1
TABLET ORAL
Qty: 90 TABLET | Refills: 0 | Status: SHIPPED | OUTPATIENT
Start: 2024-06-21

## 2024-07-10 DIAGNOSIS — N42.9 DISORDER OF PROSTATE: ICD-10-CM

## 2024-07-10 RX ORDER — TADALAFIL 5 MG/1
5 TABLET ORAL DAILY PRN
Qty: 90 TABLET | Refills: 0 | Status: SHIPPED | OUTPATIENT
Start: 2024-07-10

## 2024-07-30 ENCOUNTER — HOSPITAL ENCOUNTER (OUTPATIENT)
Dept: CT IMAGING | Facility: HOSPITAL | Age: 54
Discharge: HOME | End: 2024-07-30
Admitting: NURSE PRACTITIONER
Payer: COMMERCIAL

## 2024-07-30 DIAGNOSIS — Z12.2 SCREENING FOR LUNG CANCER: ICD-10-CM

## 2024-07-30 DIAGNOSIS — F17.200 TOBACCO USE DISORDER: ICD-10-CM

## 2024-07-30 PROCEDURE — 71271 CT THORAX LUNG CANCER SCR C-: CPT

## 2024-08-05 ENCOUNTER — TELEPHONE (OUTPATIENT)
Dept: INTERNAL MEDICINE | Facility: CLINIC | Age: 54
End: 2024-08-05
Payer: COMMERCIAL

## 2024-08-05 NOTE — TELEPHONE ENCOUNTER
----- Message from Megan Crowley sent at 8/5/2024 10:46 AM EDT -----  Please notify patient that low dose lung cancer screening is benign, recommend annual screening

## 2024-09-05 ENCOUNTER — OFFICE VISIT (OUTPATIENT)
Dept: INTERNAL MEDICINE | Facility: CLINIC | Age: 54
End: 2024-09-05
Payer: COMMERCIAL

## 2024-09-05 VITALS
OXYGEN SATURATION: 95 % | DIASTOLIC BLOOD PRESSURE: 70 MMHG | HEIGHT: 70 IN | WEIGHT: 193 LBS | BODY MASS INDEX: 27.63 KG/M2 | RESPIRATION RATE: 16 BRPM | SYSTOLIC BLOOD PRESSURE: 122 MMHG | HEART RATE: 45 BPM

## 2024-09-05 DIAGNOSIS — F41.1 GENERALIZED ANXIETY DISORDER: ICD-10-CM

## 2024-09-05 PROCEDURE — 99214 OFFICE O/P EST MOD 30 MIN: CPT | Performed by: NURSE PRACTITIONER

## 2024-09-05 RX ORDER — ALPRAZOLAM 0.5 MG
0.5 TABLET ORAL 3 TIMES DAILY PRN
Qty: 90 TABLET | Refills: 1 | Status: SHIPPED | OUTPATIENT
Start: 2024-09-05

## 2024-09-05 RX ORDER — BUSPIRONE HYDROCHLORIDE 7.5 MG/1
7.5 TABLET ORAL 2 TIMES DAILY
Qty: 60 TABLET | Refills: 5 | Status: SHIPPED | OUTPATIENT
Start: 2024-09-05

## 2024-09-05 NOTE — PROGRESS NOTES
Subjective   Clarence Hoang is a 54 y.o. male. Patient is here today for   Chief Complaint   Patient presents with    Anxiety   Answers submitted by the patient for this visit:  Primary Reason for Visit (Submitted on 2024)  What is the primary reason for your visit?: Anxiety       Vitals:    24 0825   BP: 122/70   Pulse: (!) 45   Resp: 16   SpO2: 95%     Body mass index is 27.69 kg/m².  The following portions of the patient's history were reviewed and updated as appropriate: allergies, current medications, past family history, past medical history, past social history, past surgical history and problem list.    Past Medical History:   Diagnosis Date    Anxiety     Cataract     GERD (gastroesophageal reflux disease)     Overactive bladder       No Known Allergies   Social History     Socioeconomic History    Marital status: Single   Tobacco Use    Smoking status: Former     Current packs/day: 0.00     Average packs/day: 2.0 packs/day for 30.0 years (60.0 ttl pk-yrs)     Types: Cigarettes, Electronic Cigarette     Start date: 1987     Quit date: 2017     Years since quittin.6    Smokeless tobacco: Current    Tobacco comments:     vaping    Vaping Use    Vaping status: Every Day    Substances: Nicotine, Flavoring    Devices: Pre-filled or refillable cartridge   Substance and Sexual Activity    Alcohol use: Not Currently    Drug use: Never    Sexual activity: Yes     Partners: Male     Birth control/protection: Partner of same sex        Current Outpatient Medications:     ALPRAZolam (XANAX) 0.5 MG tablet, Take 1 tablet by mouth 3 (Three) Times a Day As Needed for Anxiety., Disp: 90 tablet, Rfl: 1    famotidine (PEPCID) 20 MG tablet, TAKE ONE TABLET BY MOUTH TWICE A DAY, Disp: 180 tablet, Rfl: 3    pantoprazole (PROTONIX) 40 MG EC tablet, TAKE ONE TABLET BY MOUTH DAILY, Disp: 90 tablet, Rfl: 3    tadalafil (CIALIS) 5 MG tablet, TAKE 1 TABLET BY MOUTH DAILY AS NEEDED FOR ERECTILE DYSFUNCTION,  Disp: 90 tablet, Rfl: 0    traZODone (DESYREL) 150 MG tablet, TAKE ONE TABLET BY MOUTH ONCE NIGHTLY, Disp: 90 tablet, Rfl: 3    busPIRone (BUSPAR) 7.5 MG tablet, Take 1 tablet by mouth 2 (Two) Times a Day., Disp: 60 tablet, Rfl: 5     Objective     History of Present Illness  Norberto is a 54 year old male patient who is here for a follow up for anxiety. He is on trazodone at night and takes alprazolam as needed. He was seeing a psychiatrist previously but she retired. He was on remeron for anxiety as well but stopped taking it awhile ago. He is having more anxiety and agitation. He feels overwhelmed. He denies depression.   He would like to discuss alternate treatment     Review of Systems   Respiratory:  Negative for shortness of breath.    Cardiovascular:  Negative for chest pain and palpitations.   Gastrointestinal:  Negative for nausea.   Neurological:  Negative for dizziness.   Psychiatric/Behavioral:  Positive for agitation and sleep disturbance. Negative for dysphoric mood and suicidal ideas. The patient is nervous/anxious.        Physical Exam  Vitals and nursing note reviewed.   Constitutional:       General: He is not in acute distress.  Cardiovascular:      Rate and Rhythm: Regular rhythm. Bradycardia present.   Pulmonary:      Effort: Pulmonary effort is normal.      Breath sounds: Normal breath sounds.   Skin:     General: Skin is warm and dry.   Neurological:      Mental Status: He is alert and oriented to person, place, and time.   Psychiatric:         Mood and Affect: Mood normal.         Assessment    ASSESSMENT    Diagnoses and all orders for this visit:    1. Generalized anxiety disorder  -     ALPRAZolam (XANAX) 0.5 MG tablet; Take 1 tablet by mouth 3 (Three) Times a Day As Needed for Anxiety.  Dispense: 90 tablet; Refill: 1    Other orders  -     busPIRone (BUSPAR) 7.5 MG tablet; Take 1 tablet by mouth 2 (Two) Times a Day.  Dispense: 60 tablet; Refill: 5          PLAN    He can continue trazodone  and alprazolam as needed. PDMP reviewed and is appropriate   Will start buspirone 7.5mg twice daily. Discussed potential side effects and information on medication added to AVS. He will let me know if he cannot tolerate and let me know how he is doing in the next 4 weeks   Return in about 6 months (around 3/5/2025) for Annual physical, with labs. Including UDS

## 2024-10-15 DIAGNOSIS — N42.9 DISORDER OF PROSTATE: ICD-10-CM

## 2024-10-15 RX ORDER — TADALAFIL 5 MG/1
5 TABLET ORAL DAILY PRN
Qty: 90 TABLET | Refills: 0 | Status: SHIPPED | OUTPATIENT
Start: 2024-10-15

## 2024-12-20 RX ORDER — TRAZODONE HYDROCHLORIDE 150 MG/1
150 TABLET ORAL NIGHTLY
Qty: 90 TABLET | Refills: 3 | Status: SHIPPED | OUTPATIENT
Start: 2024-12-20

## 2025-01-22 DIAGNOSIS — N42.9 DISORDER OF PROSTATE: ICD-10-CM

## 2025-01-22 RX ORDER — TADALAFIL 5 MG/1
5 TABLET ORAL DAILY PRN
Qty: 90 TABLET | Refills: 0 | Status: SHIPPED | OUTPATIENT
Start: 2025-01-22

## 2025-02-06 DIAGNOSIS — F41.1 GENERALIZED ANXIETY DISORDER: ICD-10-CM

## 2025-02-06 RX ORDER — ALPRAZOLAM 0.5 MG
0.5 TABLET ORAL 3 TIMES DAILY PRN
Qty: 90 TABLET | Refills: 1 | Status: SHIPPED | OUTPATIENT
Start: 2025-02-06

## 2025-02-10 ENCOUNTER — PATIENT MESSAGE (OUTPATIENT)
Dept: INTERNAL MEDICINE | Facility: CLINIC | Age: 55
End: 2025-02-10
Payer: COMMERCIAL

## 2025-02-10 DIAGNOSIS — N42.9 DISORDER OF PROSTATE: ICD-10-CM

## 2025-02-10 DIAGNOSIS — F41.1 GENERALIZED ANXIETY DISORDER: ICD-10-CM

## 2025-02-10 RX ORDER — ALPRAZOLAM 0.5 MG
0.5 TABLET ORAL 3 TIMES DAILY PRN
Qty: 90 TABLET | Refills: 1 | OUTPATIENT
Start: 2025-02-10

## 2025-02-10 RX ORDER — TADALAFIL 5 MG/1
5 TABLET ORAL DAILY PRN
Qty: 90 TABLET | Refills: 0 | Status: SHIPPED | OUTPATIENT
Start: 2025-02-10

## 2025-02-10 RX ORDER — TADALAFIL 5 MG/1
5 TABLET ORAL DAILY PRN
Qty: 90 TABLET | Refills: 0 | OUTPATIENT
Start: 2025-02-10

## 2025-02-25 DIAGNOSIS — Z12.5 SCREENING FOR PROSTATE CANCER: ICD-10-CM

## 2025-02-25 DIAGNOSIS — Z79.899 HIGH RISK MEDICATION USE: ICD-10-CM

## 2025-02-25 DIAGNOSIS — Z00.00 ROUTINE HEALTH MAINTENANCE: Primary | ICD-10-CM

## 2025-03-08 LAB
ALBUMIN SERPL-MCNC: 4.2 G/DL (ref 3.5–5.2)
ALBUMIN/GLOB SERPL: 1.8 G/DL
ALP SERPL-CCNC: 42 U/L (ref 39–117)
ALT SERPL-CCNC: 24 U/L (ref 1–41)
AMPHETAMINES UR QL SCN: NEGATIVE NG/ML
APPEARANCE UR: CLEAR
AST SERPL-CCNC: 22 U/L (ref 1–40)
BACTERIA #/AREA URNS HPF: NORMAL /HPF
BARBITURATES UR QL SCN: NEGATIVE NG/ML
BASOPHILS # BLD AUTO: 0.01 10*3/MM3 (ref 0–0.2)
BASOPHILS NFR BLD AUTO: 0.2 % (ref 0–1.5)
BENZODIAZ UR QL: POSITIVE NG/ML
BILIRUB SERPL-MCNC: 0.4 MG/DL (ref 0–1.2)
BILIRUB UR QL STRIP: NEGATIVE
BUN SERPL-MCNC: 19 MG/DL (ref 6–20)
BUN/CREAT SERPL: 20.9 (ref 7–25)
BZE UR QL SCN: NEGATIVE NG/ML
CALCIUM SERPL-MCNC: 9.2 MG/DL (ref 8.6–10.5)
CANNABINOIDS UR QL CFM: POSITIVE
CASTS URNS MICRO: NORMAL
CHLORIDE SERPL-SCNC: 104 MMOL/L (ref 98–107)
CHOLEST SERPL-MCNC: 175 MG/DL (ref 0–200)
CO2 SERPL-SCNC: 27.7 MMOL/L (ref 22–29)
COLOR UR: YELLOW
CREAT SERPL-MCNC: 0.91 MG/DL (ref 0.76–1.27)
CREAT UR-MCNC: 127.4 MG/DL (ref 20–300)
EGFRCR SERPLBLD CKD-EPI 2021: 100.2 ML/MIN/1.73
EOSINOPHIL # BLD AUTO: 0 10*3/MM3 (ref 0–0.4)
EOSINOPHIL NFR BLD AUTO: 0 % (ref 0.3–6.2)
EPI CELLS #/AREA URNS HPF: NORMAL /HPF
ERYTHROCYTE [DISTWIDTH] IN BLOOD BY AUTOMATED COUNT: 13.1 % (ref 12.3–15.4)
FENTANYL UR-MCNC: NEGATIVE PG/ML
GLOBULIN SER CALC-MCNC: 2.4 GM/DL
GLUCOSE SERPL-MCNC: 80 MG/DL (ref 65–99)
GLUCOSE UR QL STRIP: NEGATIVE
HCT VFR BLD AUTO: 42.9 % (ref 37.5–51)
HDLC SERPL-MCNC: 48 MG/DL (ref 40–60)
HGB BLD-MCNC: 14.2 G/DL (ref 13–17.7)
HGB UR QL STRIP: NEGATIVE
IMM GRANULOCYTES # BLD AUTO: 0.01 10*3/MM3 (ref 0–0.05)
IMM GRANULOCYTES NFR BLD AUTO: 0.2 % (ref 0–0.5)
KETONES UR QL STRIP: NEGATIVE
LABORATORY COMMENT REPORT: ABNORMAL
LDLC SERPL CALC-MCNC: 111 MG/DL (ref 0–100)
LDLC/HDLC SERPL: 2.3 {RATIO}
LEUKOCYTE ESTERASE UR QL STRIP: NEGATIVE
LYMPHOCYTES # BLD AUTO: 2.6 10*3/MM3 (ref 0.7–3.1)
LYMPHOCYTES NFR BLD AUTO: 40.9 % (ref 19.6–45.3)
MCH RBC QN AUTO: 30.8 PG (ref 26.6–33)
MCHC RBC AUTO-ENTMCNC: 33.1 G/DL (ref 31.5–35.7)
MCV RBC AUTO: 93.1 FL (ref 79–97)
MEPERIDINE UR QL: NEGATIVE NG/ML
METHADONE UR QL SCN: NEGATIVE NG/ML
MONOCYTES # BLD AUTO: 0.49 10*3/MM3 (ref 0.1–0.9)
MONOCYTES NFR BLD AUTO: 7.7 % (ref 5–12)
NEUTROPHILS # BLD AUTO: 3.24 10*3/MM3 (ref 1.7–7)
NEUTROPHILS NFR BLD AUTO: 51 % (ref 42.7–76)
NITRITE UR QL STRIP: NEGATIVE
NRBC BLD AUTO-RTO: 0 /100 WBC (ref 0–0.2)
OPIATES UR QL SCN: NEGATIVE NG/ML
OXYCODONE+OXYMORPHONE UR QL SCN: NEGATIVE NG/ML
PCP UR QL: NEGATIVE NG/ML
PH UR STRIP: 5.5 [PH] (ref 5–8)
PH UR: 5.2 [PH] (ref 4.5–8.9)
PLATELET # BLD AUTO: 195 10*3/MM3 (ref 140–450)
POTASSIUM SERPL-SCNC: 4.1 MMOL/L (ref 3.5–5.2)
PROPOXYPH UR QL SCN: NEGATIVE NG/ML
PROT SERPL-MCNC: 6.6 G/DL (ref 6–8.5)
PROT UR QL STRIP: NEGATIVE
PSA SERPL-MCNC: 0.67 NG/ML (ref 0–4)
RBC # BLD AUTO: 4.61 10*6/MM3 (ref 4.14–5.8)
RBC #/AREA URNS HPF: NORMAL /HPF
SODIUM SERPL-SCNC: 142 MMOL/L (ref 136–145)
SP GR UR STRIP: 1.02 (ref 1–1.03)
SP GR UR: 1.02
TRAMADOL UR QL SCN: NEGATIVE NG/ML
TRIGL SERPL-MCNC: 84 MG/DL (ref 0–150)
TSH SERPL DL<=0.005 MIU/L-ACNC: 0.83 UIU/ML (ref 0.27–4.2)
UROBILINOGEN UR STRIP-MCNC: NORMAL MG/DL
VLDLC SERPL CALC-MCNC: 16 MG/DL (ref 5–40)
WBC # BLD AUTO: 6.35 10*3/MM3 (ref 3.4–10.8)
WBC #/AREA URNS HPF: NORMAL /HPF

## 2025-03-11 ENCOUNTER — OFFICE VISIT (OUTPATIENT)
Dept: INTERNAL MEDICINE | Facility: CLINIC | Age: 55
End: 2025-03-11
Payer: COMMERCIAL

## 2025-03-11 VITALS
SYSTOLIC BLOOD PRESSURE: 124 MMHG | HEIGHT: 70 IN | BODY MASS INDEX: 28.35 KG/M2 | OXYGEN SATURATION: 98 % | DIASTOLIC BLOOD PRESSURE: 70 MMHG | WEIGHT: 198 LBS | HEART RATE: 50 BPM | RESPIRATION RATE: 16 BRPM

## 2025-03-11 DIAGNOSIS — M25.561 CHRONIC PAIN OF RIGHT KNEE: ICD-10-CM

## 2025-03-11 DIAGNOSIS — G89.29 CHRONIC PAIN OF RIGHT KNEE: ICD-10-CM

## 2025-03-11 DIAGNOSIS — Z00.00 ANNUAL PHYSICAL EXAM: Primary | ICD-10-CM

## 2025-03-11 DIAGNOSIS — Z23 ENCOUNTER FOR PREVNAR PNEUMOCOCCAL VACCINATION: ICD-10-CM

## 2025-03-11 DIAGNOSIS — Z72.89 CURRENT EVERY DAY VAPING: ICD-10-CM

## 2025-03-11 DIAGNOSIS — F17.200 CURRENT SMOKER: ICD-10-CM

## 2025-03-11 DIAGNOSIS — E78.00 ELEVATED LDL CHOLESTEROL LEVEL: ICD-10-CM

## 2025-03-11 DIAGNOSIS — F41.1 GENERALIZED ANXIETY DISORDER: ICD-10-CM

## 2025-03-11 DIAGNOSIS — M20.001 DEFORMITY OF RIGHT THUMB JOINT: ICD-10-CM

## 2025-03-11 DIAGNOSIS — R10.13 EPIGASTRIC PAIN: ICD-10-CM

## 2025-03-11 PROBLEM — K44.9 HIATAL HERNIA: Status: RESOLVED | Noted: 2020-12-02 | Resolved: 2025-03-11

## 2025-03-11 PROBLEM — N42.9 DISORDER OF PROSTATE: Status: RESOLVED | Noted: 2019-11-05 | Resolved: 2025-03-11

## 2025-03-11 RX ORDER — BUSPIRONE HYDROCHLORIDE 15 MG/1
15 TABLET ORAL 2 TIMES DAILY
Qty: 60 TABLET | Refills: 5 | Status: SHIPPED | OUTPATIENT
Start: 2025-03-11

## 2025-03-11 RX ORDER — VARENICLINE TARTRATE 0.5 (11)-1
KIT ORAL
Qty: 1 EACH | Refills: 0 | Status: SHIPPED | OUTPATIENT
Start: 2025-03-11 | End: 2025-04-08

## 2025-03-11 RX ORDER — TRAZODONE HYDROCHLORIDE 150 MG/1
150 TABLET ORAL NIGHTLY
Qty: 90 TABLET | Refills: 3 | Status: SHIPPED | OUTPATIENT
Start: 2025-03-11

## 2025-03-11 NOTE — LETTER
[facility]  Vaccine Consent Form    Patient Name:  Clarence Hoang  Patient :  1970     Vaccine(s) Ordered    Pneumococcal Conjugate Vaccine 20-Valent (PCV20)        Screening Checklist  The following questions should be completed prior to vaccination. If you answer “yes” to any question, it does not necessarily mean you should not be vaccinated. It just means we may need to clarify or ask more questions. If a question is unclear, please ask your healthcare provider to explain it.    Yes No   Any fever or moderate to severe illness today (mild illness and/or antibiotic treatment are not contraindications)?     Do you have a history of a serious reaction to any previous vaccinations, such as anaphylaxis, encephalopathy within 7 days, Guillain-Black syndrome within 6 weeks, seizure?     Have you received any live vaccine(s) (e.g MMR, KENNY) or any other vaccines in the last month (to ensure duplicate doses aren't given)?     Do you have an anaphylactic allergy to latex (DTaP, DTaP-IPV, Hep A, Hep B, MenB, RV, Td, Tdap), baker’s yeast (Hep B, HPV), polysorbates (RSV, nirsevimab, PCV 20, Rotavirrus, Tdap, Shingrix), or gelatin (KENNY, MMR)?     Do you have an anaphylactic allergy to neomycin (Rabies, KENNY, MMR, IPV, Hep A), polymyxin B (IPV), or streptomycin (IPV)?      Any cancer, leukemia, AIDS, or other immune system disorder? (KENNY, MMR, RV)     Do you have a parent, brother, or sister with an immune system problem (if immune competence of vaccine recipient clinically verified, can proceed)? (MMR, KENNY)     Any recent steroid treatments for >2 weeks, chemotherapy, or radiation treatment? (KENNY, MMR)     Have you received antibody-containing blood transfusions or IVIG in the past 11 months (recommended interval is dependent on product)? (MMR, KENNY)     Have you taken antiviral drugs (acyclovir, famciclovir, valacyclovir for KENNY) in the last 24 or 48 hours, respectively?      Are you pregnant or planning to  "become pregnant within 1 month? (KENNY, MMR, HPV, IPV, MenB, Abrexvy; For Hep B- refer to Engerix-B; For RSV - Abrysvo is indicated for 32-36 weeks of pregnancy from September to January)     For infants, have you ever been told your child has had intussusception or a medical emergency involving obstruction of the intestine (Rotavirus)? If not for an infant, can skip this question.         *Ordering Physicians/APC should be consulted if \"yes\" is checked by the patient or guardian above.  I have received, read, and understand the Vaccine Information Statement (VIS) for each vaccine ordered.  I have considered my or my child's health status as well as the health status of my close contacts.  I have taken the opportunity to discuss my vaccine questions with my or my child's health care provider.   I have requested that the ordered vaccine(s) be given to me or my child.  I understand the benefits and risks of the vaccines.  I understand that I should remain in the clinic for 15 minutes after receiving the vaccine(s).  _________________________________________________________  Signature of Patient or Parent/Legal Guardian ____________________  Date   "

## 2025-03-11 NOTE — PROGRESS NOTES
Subjective   Clarence Hoang is a 55 y.o. male. Patient is here today for   Chief Complaint   Patient presents with    Annual Exam    Anxiety    Knee Pain    Hand Pain          Vitals:    25 0857   BP: 124/70   Pulse: 50   Resp: 16   SpO2: 98%     Body mass index is 28.41 kg/m².    The following portions of the patient's history were reviewed and updated as appropriate: allergies, current medications, past family history, past medical history, past social history, past surgical history and problem list.    Past Medical History:   Diagnosis Date    Anxiety     Cataract     GERD (gastroesophageal reflux disease)     Overactive bladder       No Known Allergies   Social History     Socioeconomic History    Marital status: Single   Tobacco Use    Smoking status: Former     Current packs/day: 0.00     Average packs/day: 2.0 packs/day for 30.0 years (60.0 ttl pk-yrs)     Types: Cigarettes, Electronic Cigarette     Start date: 1987     Quit date: 2017     Years since quittin.1     Passive exposure: Never    Smokeless tobacco: Current    Tobacco comments:     vaping    Vaping Use    Vaping status: Every Day    Substances: Nicotine, Flavoring    Devices: Pre-filled or refillable cartridge   Substance and Sexual Activity    Alcohol use: Not Currently    Drug use: Never    Sexual activity: Yes     Partners: Male     Birth control/protection: Partner of same sex        Current Outpatient Medications:     ALPRAZolam (XANAX) 0.5 MG tablet, TAKE 1 TABLET BY MOUTH 3 TIMES A DAY AS NEEDED FOR ANXIETY, Disp: 90 tablet, Rfl: 1    busPIRone (BUSPAR) 15 MG tablet, Take 1 tablet by mouth 2 (Two) Times a Day., Disp: 60 tablet, Rfl: 5    famotidine (PEPCID) 20 MG tablet, TAKE ONE TABLET BY MOUTH TWICE A DAY, Disp: 180 tablet, Rfl: 3    pantoprazole (PROTONIX) 40 MG EC tablet, TAKE ONE TABLET BY MOUTH DAILY, Disp: 90 tablet, Rfl: 3    tadalafil (CIALIS) 5 MG tablet, Take 1 tablet by mouth Daily As Needed for Erectile  Dysfunction., Disp: 90 tablet, Rfl: 0    traZODone (DESYREL) 150 MG tablet, Take 1 tablet by mouth Every Night., Disp: 90 tablet, Rfl: 3    Varenicline Tartrate, Starter, 0.5 MG X 11 & 1 MG X 42 tablet therapy pack, Take 0.5 mg by mouth Daily for 3 days, THEN 0.5 mg 2 (Two) Times a Day for 4 days, THEN 1 mg 2 (Two) Times a Day for 21 days. Take 0.5 mg po daily x 3 days, then 0.5 mg po bid x 4 days, then 1 mg po bid, Disp: 1 each, Rfl: 0          History of Present Illness   Clarence Hoang 55 y.o. male who presents for an Annual physical exam and for a follow up for anxiety. He also has several concerns to discuss   He takes buspirone and alprazolam as needed for anxiety. He has taken this for years and it was prescribed previously by his psychiatrist.   He has had more anxiety and would like to discuss increasing buspirone   He c/o right knee pain for 2 months. He has pain with certail movements, popping and cracking. He can bear weight. Denies any known injury  Has had intermittent pain in the epigastric area for several months. He states there is no associated symptoms like nausea, shortness of breath, sweating. Cough. It does not radiate. He reports he was diagnosed with hiatal hernia years ago but has not had any imaging or EGD. He has been on both pepcid and protonix for years   He also c/o right thumb pain and deformity for over a year. Has seen hand surgery in the past for this but it has worsened. He states then he accidentally closed the  on his thumb a few months ago making the pain worse.   He also vapes and would like to discuss quitting smoking.       Lab results discussed in detail with the patient.  Plan to update vaccines if needed today.    Health Habits:  Dental Exam. not up to date - .  Eye Exam. up to date  Exercise: 5 times/week.  Current exercise activities include: walking and weightlifting      Preventative counseling  - seat belt use for every car ride for patient and all  occupants.   Encouraged sunscreen use to reduce risk of skin cancer for any days with sun exposure over 20 minutes.   -Encouraged annual dental and vision exams as part of their overall health.   -Encouraged  exercise  combined with a well-balanced diet.   -Immunizations reviewed and updated in EMR.     The 10-year ASCVD risk score (Jessi GROVE, et al., 2019) is: 4.3%    Values used to calculate the score:      Age: 55 years      Sex: Male      Is Non- : No      Diabetic: No      Tobacco smoker: No      Systolic Blood Pressure: 124 mmHg      Is BP treated: No      HDL Cholesterol: 48 mg/dL      Total Cholesterol: 175 mg/dL    Patient's (Body mass index is 28.41 kg/m².) indicates that they are overweight (BMI 25-29.9) with health related conditions that include none . Weight is unchanged. BMI is is above average; BMI management plan is completed. We discussed Information on healthy weight added to patient's after visit summary.       Lab Results (most recent)       Orders Only on 02/25/2025   Component Date Value Ref Range Status    WBC 03/04/2025 6.35  3.40 - 10.80 10*3/mm3 Final    RBC 03/04/2025 4.61  4.14 - 5.80 10*6/mm3 Final    Hemoglobin 03/04/2025 14.2  13.0 - 17.7 g/dL Final    Hematocrit 03/04/2025 42.9  37.5 - 51.0 % Final    MCV 03/04/2025 93.1  79.0 - 97.0 fL Final    MCH 03/04/2025 30.8  26.6 - 33.0 pg Final    MCHC 03/04/2025 33.1  31.5 - 35.7 g/dL Final    RDW 03/04/2025 13.1  12.3 - 15.4 % Final    Platelets 03/04/2025 195  140 - 450 10*3/mm3 Final    Neutrophil Rel % 03/04/2025 51.0  42.7 - 76.0 % Final    Lymphocyte Rel % 03/04/2025 40.9  19.6 - 45.3 % Final    Monocyte Rel % 03/04/2025 7.7  5.0 - 12.0 % Final    Eosinophil Rel % 03/04/2025 0.0 (L)  0.3 - 6.2 % Final    Basophil Rel % 03/04/2025 0.2  0.0 - 1.5 % Final    Neutrophils Absolute 03/04/2025 3.24  1.70 - 7.00 10*3/mm3 Final    Lymphocytes Absolute 03/04/2025 2.60  0.70 - 3.10 10*3/mm3 Final    Monocytes Absolute  03/04/2025 0.49  0.10 - 0.90 10*3/mm3 Final    Eosinophils Absolute 03/04/2025 0.00  0.00 - 0.40 10*3/mm3 Final    Basophils Absolute 03/04/2025 0.01  0.00 - 0.20 10*3/mm3 Final    Immature Granulocyte Rel % 03/04/2025 0.2  0.0 - 0.5 % Final    Immature Grans Absolute 03/04/2025 0.01  0.00 - 0.05 10*3/mm3 Final    nRBC 03/04/2025 0.0  0.0 - 0.2 /100 WBC Final    Glucose 03/04/2025 80  65 - 99 mg/dL Final    BUN 03/04/2025 19  6 - 20 mg/dL Final    Creatinine 03/04/2025 0.91  0.76 - 1.27 mg/dL Final    EGFR Result 03/04/2025 100.2  >60.0 mL/min/1.73 Final    Comment: GFR Categories in Chronic Kidney Disease (CKD)/X09/  /X09/  GFR Category          GFR (mL/min/1.73)    Interpretation  G1/X09/                    90 or greater/X09/        Normal or high  (1)  G2//                    60-89                Mild decrease  (1)  G3a                   45-59                Mild to moderate  decrease  G3b                   30-44                Moderate to  severe decrease  G4                    15-29                Severe decrease  G5                    14 or less           Kidney failure//  /[id number]/  (1)In the absence of evidence of kidney disease, neither  GFR category G1 or G2 fulfill the criteria for CKD.  eGFR calculation 2021 CKD-EPI creatinine equation, which  does not include race as a factor      BUN/Creatinine Ratio 03/04/2025 20.9  7.0 - 25.0 Final    Sodium 03/04/2025 142  136 - 145 mmol/L Final    Potassium 03/04/2025 4.1  3.5 - 5.2 mmol/L Final    Chloride 03/04/2025 104  98 - 107 mmol/L Final    Total CO2 03/04/2025 27.7  22.0 - 29.0 mmol/L Final    Calcium 03/04/2025 9.2  8.6 - 10.5 mg/dL Final    Total Protein 03/04/2025 6.6  6.0 - 8.5 g/dL Final    Albumin 03/04/2025 4.2  3.5 - 5.2 g/dL Final    Globulin 03/04/2025 2.4  gm/dL Final    A/G Ratio 03/04/2025 1.8  g/dL Final    Total Bilirubin 03/04/2025 0.4  0.0 - 1.2 mg/dL Final    Alkaline Phosphatase 03/04/2025 42  39 - 117 U/L Final    AST (SGOT)  03/04/2025 22  1 - 40 U/L Final    ALT (SGPT) 03/04/2025 24  1 - 41 U/L Final    Total Cholesterol 03/04/2025 175  0 - 200 mg/dL Final    Comment: Cholesterol Reference Ranges  (U.S. Department of Health and Human Services ATP III  Classifications)  Desirable          <200 mg/dL  Borderline High    200-239 mg/dL  High Risk          >240 mg/dL  Triglyceride Reference Ranges  (U.S. Department of Health and Human Services ATP III  Classifications)  Normal           <150 mg/dL  Borderline High  150-199 mg/dL  High             200-499 mg/dL  Very High        >500 mg/dL  HDL Reference Ranges  (U.S. Department of Health and Human Services ATP III  Classifications)  Low     <40 mg/dl (major risk factor for CHD)  High    >60 mg/dl ('negative' risk factor for CHD)  LDL Reference Ranges  (U.S. Department of Health and Human Services ATP III  Classifications)  Optimal          <100 mg/dL  Near Optimal     100-129 mg/dL  Borderline High  130-159 mg/dL  High             160-189 mg/dL  Very High        >189 mg/dL  LDL is calculated using the NIH LDL-C calculation.      Triglycerides 03/04/2025 84  0 - 150 mg/dL Final    HDL Cholesterol 03/04/2025 48  40 - 60 mg/dL Final    VLDL Cholesterol Dami 03/04/2025 16  5 - 40 mg/dL Final    LDL Chol Calc (NIH) 03/04/2025 111 (H)  0 - 100 mg/dL Final    LDL/HDL RATIO 03/04/2025 2.30   Final    TSH 03/04/2025 0.830  0.270 - 4.200 uIU/mL Final    Amphetamine, Urine Qual 03/04/2025 Negative  Mwwftq=1715 ng/mL Final    Barbiturates Screen, Urine 03/04/2025 Negative  Xngkrj=062 ng/mL Final    Benzodiazepine Screen, Urine 03/04/2025 Positive (A)  Dtilia=703 ng/mL Final    Comment:   Nordiazepam               Negative            Yplnqy=756            01    Oxazepam                  Negative            Nsptxm=300            01    Flurazepam                Negative            Swkdit=681            01    Lorazepam                 Negative            Encges=510            01    Alprazolam                 Positive        A                         01  Alprazolam Conf, MS, UR     209                ng/mL Rcvvpx=117       01  Alprazolam detected; this finding is consistent with use of  medications that include Xanax, or generic formulations. Drugs  listed are representative of common sources of the compound detected  and are not intended to include all possible sources.    Clonazepam                Negative            Omtsll=936            01    Temazepam                 Negative            Ttcbpg=236            01    Triazolam                 Negative            Mjefck=619            01    Midazolam                 Negative            Ypxwfd=570            01      THC Screen, Urine 03/04/2025 Positive (A)  Cutoff=20 Final    Carboxy THC Conf, MS, UR    621                ng/mL Cutoff=10        01    Cocaine Screen, Urine 03/04/2025 Negative  Vcocrh=826 ng/mL Final    Opiate Screen, Urine 03/04/2025 Negative  Bgozol=942 ng/mL Final    Opiate test includes Codeine, Morphine, Hydromorphone, Hydrocodone.    Oxycodone/Oxymorphone, Urine 03/04/2025 Negative  Htwozs=286 ng/mL Final    Test includes Oxycodone and Oxymorphone    Phencyclidine (PCP), Urine 03/04/2025 Negative  Cutoff=25 ng/mL Final    Methadone Screen, Urine 03/04/2025 Negative  Apbsuw=422 ng/mL Final    Propoxyphene Screen 03/04/2025 Negative  Gbuosx=423 ng/mL Final    Meperidine, Urine 03/04/2025 Negative  Yoiton=221 ng/mL Final    Comment: This test was developed and its performance characteristics  determined by [facility]. It has not been cleared or approved  by the Food and Drug Administration.      Fentanyl, Urine 03/04/2025 Negative  Zehaqu=0208 pg/mL Final    Comment: Test includes Fentanyl and Norfentanyl  This test was developed and its performance characteristics  determined by [facility]. It has not been cleared or approved  by the Food and Drug Administration.      Tramadol Screen, Urine 03/04/2025 Negative  Vtoanc=023 ng/mL Final    Creatinine, Urine  03/04/2025 127.4  20.0 - 300.0 mg/dL Final    Specific Gravity, UA 03/04/2025 1.020   Final    pH, UA 03/04/2025 5.2  4.5 - 8.9 Final    Please note 03/04/2025 Comment   Final    Comment: Drug test results should be interpreted in the context of clinical  information. Patient metabolic variables, specific drug chemistry, and  specimen characteristics can affect test outcome. Technical  consultation is available if a test result is inconsistent with an  expected outcome.    Email:  [email or web address]    Phone:  [phone]  Drug brands, if listed herein, are trademarks of their respective  owners.      PSA 03/04/2025 0.674  0.000 - 4.000 ng/mL Final    Comment: Testing Method: [employer] Electrochemiluminescence  Immunoassay(ECLIA)  Values obtained with different assay methods or kits cannot  be used interchangeably.      Specific Gravity, UA 03/04/2025 1.025  1.005 - 1.030 Final    pH, UA 03/04/2025 5.5  5.0 - 8.0 Final    Color, UA 03/04/2025 Yellow   Final    REFERENCE RANGE: Yellow, Straw    Appearance, UA 03/04/2025 Clear  Clear Final    Leukocytes, UA 03/04/2025 Negative  Negative Final    Protein 03/04/2025 Negative  Negative Final    Glucose, UA 03/04/2025 Negative  Negative Final    Ketones 03/04/2025 Negative  Negative Final    Blood, UA 03/04/2025 Negative  Negative Final    Bilirubin, UA 03/04/2025 Negative  Negative Final    Urobilinogen, UA 03/04/2025 Comment   Final    Comment: 0.2 E.U./dL  REFERENCE RANGE: 0.2 - 1.0 E.U./dL      Nitrite, UA 03/04/2025 Negative  Negative Final    WBC, UA 03/04/2025 0-2  /HPF Final    REFERENCE RANGE: None Seen, 0-2    RBC, UA 03/04/2025 0-2  /HPF Final    REFERENCE RANGE: None Seen, 0-2    Epithelial Cells (non renal) 03/04/2025 0-2  /HPF Final    REFERENCE RANGE: None Seen, 0-2    Cast Type 03/04/2025 Comment   Final    HYALINE CASTS, UA            None Seen        /LPF       None Seen  N    Bacteria, UA 03/04/2025 Comment  None Seen /HPF Final     None Seen                 Health Maintenance   Topic Date Due    Hepatitis B (1 of 3 - 19+ 3-dose series) 03/11/2026 (Originally 1/5/1989)    LUNG CANCER SCREENING  07/30/2025    ANNUAL PHYSICAL  03/11/2026    BMI FOLLOWUP  03/11/2026    TDAP/TD VACCINES (2 - Td or Tdap) 12/11/2030    COLORECTAL CANCER SCREENING  02/24/2031    HEPATITIS C SCREENING  Completed    COVID-19 Vaccine  Completed    INFLUENZA VACCINE  Completed    Pneumococcal Vaccine 50+  Completed    ZOSTER VACCINE  Completed           Review of Systems   Constitutional:  Negative for fatigue.   Respiratory:  Negative for cough and shortness of breath.    Cardiovascular:  Positive for chest pain (epigastric area intermittent for several months , no associated symptoms).   Gastrointestinal:  Negative for blood in stool, constipation, diarrhea, nausea and vomiting.   Musculoskeletal:         Right thumb pain and popping for one month  Then injured by smashing in  at work.        Objective   Physical Exam  Vitals and nursing note reviewed.   Constitutional:       General: He is not in acute distress.     Appearance: Normal appearance. He is well-developed and well-groomed.   HENT:      Head: Normocephalic.      Right Ear: Tympanic membrane and ear canal normal.      Left Ear: Tympanic membrane and ear canal normal.      Nose: Nose normal.      Mouth/Throat:      Pharynx: Oropharynx is clear.   Eyes:      General: Lids are normal.   Neck:      Thyroid: No thyromegaly.   Cardiovascular:      Rate and Rhythm: Normal rate and regular rhythm.      Heart sounds: Normal heart sounds.   Pulmonary:      Effort: Pulmonary effort is normal.      Breath sounds: Normal breath sounds.   Abdominal:      General: Bowel sounds are normal.      Palpations: Abdomen is soft.   Musculoskeletal:      Right hand: Deformity (right thumb) and tenderness present. Normal capillary refill. Normal pulse.      Cervical back: Neck supple.      Right knee: Crepitus present. No  swelling or deformity. Tenderness present.   Skin:     General: Skin is warm and dry.   Neurological:      Mental Status: He is alert and oriented to person, place, and time.   Psychiatric:         Mood and Affect: Mood normal.         ASSESSMENT       Problems Addressed this Visit       Generalized anxiety disorder    Relevant Medications    Varenicline Tartrate, Starter, 0.5 MG X 11 & 1 MG X 42 tablet therapy pack    busPIRone (BUSPAR) 15 MG tablet    traZODone (DESYREL) 150 MG tablet    Elevated LDL cholesterol level    Current smoker    Current every day vaping     Other Visit Diagnoses         Annual physical exam    -  Primary      Deformity of right thumb joint        Relevant Orders    Ambulatory Referral to Hand Surgery      Encounter for Prevnar pneumococcal vaccination        Relevant Orders    Pneumococcal Conjugate Vaccine 20-Valent (PCV20) (Completed)      Chronic pain of right knee        Relevant Orders    XR Knee 1 or 2 View Right (In Office)      Epigastric pain        Relevant Orders    Ambulatory Referral to Gastroenterology          Diagnoses         Codes Comments      Annual physical exam    -  Primary ICD-10-CM: Z00.00  ICD-9-CM: V70.0       Deformity of right thumb joint     ICD-10-CM: M20.001  ICD-9-CM: 736.20       Encounter for Prevnar pneumococcal vaccination     ICD-10-CM: Z23  ICD-9-CM: V03.82       Chronic pain of right knee     ICD-10-CM: M25.561, G89.29  ICD-9-CM: 719.46, 338.29       Epigastric pain     ICD-10-CM: R10.13  ICD-9-CM: 789.06       Current smoker     ICD-10-CM: F17.200  ICD-9-CM: 305.1       Generalized anxiety disorder     ICD-10-CM: F41.1  ICD-9-CM: 300.02       Elevated LDL cholesterol level     ICD-10-CM: E78.00  ICD-9-CM: 272.0       Current every day vaping     ICD-10-CM: Z72.89  ICD-9-CM: V69.8             PLAN    Age and sex appropriate physical exam performed and documented. Updated past medical, family, social and surgical histories as well as allergies and  care team list.   Right knee pain for several months, will get an xray for further evaluation, declined referral for PT, discussed referral to ortho pending xray report  Epigastric pain- pt was told he had a hiatal hernia years ago. No previous egd or ct - has been on pantoprazole and pepcid. Will refer to GI for consult for egd.   Right thumb pain and deformity- chronic- will refer to hand surgery, declined xray today  5. Current smoker- low dose lung cancer screening due in July. Rx for chantix given- discussed potential side effects and he will let me know if he cannot tolerate it. He was advised to call in 3 weeks to get continuing dose. Set a quit date for 2 weeks.   6. Anxiety- he can continue alprazolam as needed. PDMP reviewed , contract update. Will increase buspirone to 15 mg twice daily. He will continue trazodone   7. HM. Prevnar 20 given today , discussed hep b vaccine   8. LDL is slightly elevated, 10 year ascvd risk is 4.5% recommend  lifestyle modifications Decrease/eliminate soda, caffeine, alcohol and overall caloric intake. Reduce carbohydrates and sweets in diet.  Continue to improve dietary habits with lean proteins, fresh vegetables, fruits, and nuts. Improve aerobic exercise: walking/biking/swimming daily as tolerated, recommend 30 minutes/day at least.             Return in about 6 months (around 9/11/2025) for medication check.  Patient was given instructions and counseling regarding his  condition for health maintenance advice.  Please see specific information pulled into the AVS if appropriate.

## 2025-03-14 ENCOUNTER — TELEPHONE (OUTPATIENT)
Dept: INTERNAL MEDICINE | Facility: CLINIC | Age: 55
End: 2025-03-14
Payer: COMMERCIAL

## 2025-03-14 DIAGNOSIS — G89.29 CHRONIC PAIN OF RIGHT KNEE: Primary | ICD-10-CM

## 2025-03-14 DIAGNOSIS — M25.561 CHRONIC PAIN OF RIGHT KNEE: Primary | ICD-10-CM

## 2025-03-25 RX ORDER — PANTOPRAZOLE SODIUM 40 MG/1
40 TABLET, DELAYED RELEASE ORAL DAILY
Qty: 90 TABLET | Refills: 3 | Status: SHIPPED | OUTPATIENT
Start: 2025-03-25

## 2025-04-08 RX ORDER — VARENICLINE TARTRATE 1 MG/1
1 TABLET, FILM COATED ORAL 2 TIMES DAILY
Qty: 60 TABLET | Refills: 2 | Status: SHIPPED | OUTPATIENT
Start: 2025-04-08

## 2025-04-09 ENCOUNTER — TREATMENT (OUTPATIENT)
Age: 55
End: 2025-04-09
Payer: COMMERCIAL

## 2025-04-09 DIAGNOSIS — G89.29 CHRONIC PAIN OF RIGHT KNEE: Primary | ICD-10-CM

## 2025-04-09 DIAGNOSIS — M25.561 CHRONIC PAIN OF RIGHT KNEE: Primary | ICD-10-CM

## 2025-04-09 NOTE — PATIENT INSTRUCTIONS
Access Code: [email or web address]  URL: [email or web address]  Date: 04/09/2025  Prepared by: Izzy Carpenter    Exercises  - Supine Active Straight Leg Raise  - 1 x daily - 3 x weekly - 3 sets - 10 reps  - Supine Quadricep Sets  - 1 x daily - 3 x weekly - 1 sets - 15 reps - 3 s hold  - Kettlebell Deadlift  - 1 x daily - 3 x weekly - 3 sets - 6-8 reps  - Bridge Walk Out  - 1 x daily - 3 x weekly - 3 sets - 6-8 reps  - Side Stepping with Resistance at Ankles  - 1 x daily - 3 x weekly - 1 sets - 4 reps  - Supine Hamstring Stretch with Strap  - 1 x daily - 7 x weekly - 1 sets - 5 reps - 10 s hold  - Hip Flexor Stretch at Edge of Bed  - 1 x daily - 7 x weekly - 1 sets - 10 reps  - Supine Quadriceps Stretch with Strap on Table  - 1 x daily - 7 x weekly - 1 sets - 6 reps - 10 s hold

## 2025-04-09 NOTE — PROGRESS NOTES
Physical Therapy Initial Evaluation and Plan of Care  [address]  [facility] [address]  P: [phone]  F: [phone]    Patient: Clarence Hoang   : 1970  Diagnosis/ICD-10 Code:  Chronic pain of right knee [M25.561, G89.29]  Referring practitioner: OTILIA Nichols  Date of Initial Visit: 2025  Today's Date: 2025  Patient seen for 1 session         Visit Diagnoses:    ICD-10-CM ICD-9-CM   1. Chronic pain of right knee  M25.561 719.46    G89.29 338.29         Subjective Questionnaire: LEFS: 71      Subjective Evaluation    History of Present Illness  Mechanism of injury: Pt is a 56 y/o male who presnts to PT with c/o R knee pain that started several months ago ~ 2023. Pt reports that he went rock climbing in [place] and hit his knee on the rock, but just scraped it. He reports after this he took a break for a couple of months from his normal leg lifting routine. Pt reports prior to this he was strength training his legs 2 x a week and could do goblet squats with 60-80 lbs. Pt states he then decided to return to doing this and thinks he over did and after one day he was very sore in his knee. He denies swelling, denies catching/clicking/popping. He reports after a week or two he tried again and then it caused a lot of pain in his hamstring and medial knee. He reports he had excruciating pain  the next day and he had a mai horse that night and then it finally got better because he felt like he was cautious. He reports he has only done upper body since then and that was about a month ago. Pt denies knee buckling, but reports he sometimes has the pain with going up/down the stairs, pivoting, and squatting.       Patient Occupation: bartneder/ at [employer]  Current pain ratin  At worst pain ratin  Quality: cramping, dull ache and knife-like (aleve)  Relieving factors: change in position, rest and medications  Aggravating  factors: prolonged positioning, ambulation, squatting, stairs, standing and movement    Social Support  Lives in: condominium  Lives with: spouse    Diagnostic Tests  X-ray: normal    Treatments  Current treatment: physical therapy  Patient Goals  Patient goals for therapy: increased strength, independence with ADLs/IADLs, return to sport/leisure activities, increased motion and decreased pain           Past Medical History:   Diagnosis Date    Anxiety     Cataract     GERD (gastroesophageal reflux disease)     Overactive bladder      Past Surgical History:   Procedure Laterality Date    CATARACT EXTRACTION      COLONOSCOPY N/A 2/24/2021    Procedure: COLONOSCOPY  TO CECUM;  Surgeon: Abhi Malik MD;  Location: [facility] ENDOSCOPY;  Service: General;  Laterality: N/A;  SCREENING  --DIVERTICULOSIS     HERNIA REPAIR         Objective          Tenderness     Right Knee   Tenderness in the medial joint line.     Additional Tenderness Details  TTP to medial R hamstring tendon distal    Active Range of Motion   Left Knee   Normal active range of motion    Right Knee   Normal active range of motion    Passive Range of Motion   Left Hip   Normal passive range of motion    Right Hip   Normal passive range of motion  Left Knee   Normal passive range of motion    Right Knee   Normal passive range of motion    Patellar Mobility     Right Knee Patellar tendons within functional limits include the medial, lateral, superior and inferior.     Strength/Myotome Testing     Left Hip   Planes of Motion   Flexion: 5  Extension: 5  Abduction: 4+  Adduction: 5    Right Hip   Planes of Motion   Flexion: 5  Extension: 5  Abduction: 4+  Adduction: 5    Left Knee   Flexion: 5  Extension: 5    Right Knee   Flexion: 5  Extension: 5    Left Ankle/Foot   Dorsiflexion: 5  Plantar flexion: 5    Right Ankle/Foot   Dorsiflexion: 5  Plantar flexion: 5    Tests     Right Knee   Positive Thessaly's test at 20 degrees.   Negative anterior drawer,  anterior Lachman, lateral Louis, medial Louis, Thessaly's test at 5 degrees, valgus stress test at 0 degrees, valgus stress test at 30 degrees, varus stress test at 0 degrees and varus stress test at 30 degrees.     Right Knee Flexibility Comments:   Hamstring flexibility at 90/90 lacking about 50 deg to full 0-180 knee extension    Ambulation   Weight-Bearing Status   Weight-Bearing Status (Left): full weight bearing   Weight-Bearing Status (Right): full weight-bearing    Assistive device used: none    Ambulation: Stairs   Ascend stairs: independent  Pattern: non-reciprocal  Railings: one rail  Descend stairs: independent  Pattern: non-reciprocal  Railings: one rail    Observational Gait   Gait: within functional limits           Assessment & Plan       Assessment  Impairments: abnormal gait, abnormal muscle firing, abnormal or restricted ROM, activity intolerance, impaired physical strength, lacks appropriate home exercise program, pain with function and weight-bearing intolerance   Functional limitations: lifting, uncomfortable because of pain, standing, stooping and unable to perform repetitive tasks   Assessment details: Pt is a 56 y/o male who presents to physical therapy with signs and symptoms consistent with chronic right knee pain. Pt has full knee ROM, but has significantly tight hamstrings, his distal hamstring length lacks ~50 degrees to full. In addition, pt has mild hip abductor strength deficits.  He has mildly positive meniscal testing with thessaly test with medial knee pain and some TTP along medial joint line and medial hamstring tendon. He has no knee swelling and all other ligamentous testing is negative. I performed a contract relax technique for his right hamstring and he had much improved mobility after this. Pt educated on how to perform this at home with his partner to maintain good hamstring flexibility and he voices understanding. I also educated him on strengthening and stretching  interventions to perform at home for targeted knee strengthening. He can perform all in clinic without c/o pain. I also discussed with him about gradually increasing his activity to return to his normal strength training at the gym and he voices understanding. His condition is stable in nature. I do think he would benefit from one follow up in the next 2-3 weeks in order to ensure proper home exercise performance and to ensure that he is improving with his strength training routine in order to prevent injury. Pt agrees with plan.  Barriers to therapy: personal factors: age other factors: job in which he has to be on his feet all day  Prognosis: good    Goals  Plan Goals: STG 2-4 weeks    1. Pt will be independent with home exercise program  2. Pt will be able to squat 25 lbs with good mechanics and without c/o knee pain  3. Pt will be able to go up / down the stairs alternating extremities without c/o R knee pain    LTG 4-6 weeks    1. LEFS score will be 75/80 to demonstrate subjective improvement in functional activities  2. Increase MMT for RLE to 5/5 in order to be able to improve strength with squats and going up/down stairs  3. Pt will be able to return to normal strength training routine at the gym at 50% weight without c/o R knee pain    Plan  Therapy options: will be seen for skilled therapy services  Planned modality interventions: cryotherapy, dry needling, TENS, electrical stimulation/Russian stimulation, thermotherapy (hydrocollator packs) and ultrasound  Planned therapy interventions: IADL retraining, joint mobilization, home exercise program, gait training, functional ROM exercises, flexibility, ADL retraining, balance/weight-bearing training, manual therapy, motor coordination training, neuromuscular re-education, strengthening, stretching, therapeutic activities and transfer training  Frequency: 1x week  Duration in weeks: 6  Treatment plan discussed with: patient            Timed:         Manual  Therapy:    8     mins  97703;     Therapeutic Exercise:    12     mins  43828;     Neuromuscular Joelle:    0    mins  37155;    Therapeutic Activity:     0     mins  69122;     Gait Trainin     mins  67372;     Ultrasound:     0     mins  55535;    Ionto                               0    mins   93549  Self Care                       0     mins   62928  Canalith Repos    0     mins 55220      Un-Timed:  Electrical Stimulation:    0     mins  82997 (MC );  Dry Needling     0     mins self-pay  Traction     0     mins 01512        Timed Treatment:   20   mins   Total Treatment:     50   mins          PT: Izzy Carpenter PT     License Number: [id number]      Certification Period: 2025 thru 2025  I certify that the therapy services are furnished while this patient is under my care.  The services outlined above are required by this patient, and will be reviewed every 90 days.         Physician Signature:__________________________________________________    PHYSICIAN: Megan Crowley APRN  NPI: [id number]                                            Please sign and return via fax to [phone] Thank you, [facility].

## 2025-05-04 DIAGNOSIS — N42.9 DISORDER OF PROSTATE: ICD-10-CM

## 2025-05-05 RX ORDER — TADALAFIL 5 MG/1
5 TABLET ORAL DAILY PRN
Qty: 90 TABLET | Refills: 0 | Status: SHIPPED | OUTPATIENT
Start: 2025-05-05

## 2025-06-03 ENCOUNTER — OFFICE VISIT (OUTPATIENT)
Dept: GASTROENTEROLOGY | Facility: CLINIC | Age: 55
End: 2025-06-03
Payer: COMMERCIAL

## 2025-06-03 ENCOUNTER — PATIENT ROUNDING (BHMG ONLY) (OUTPATIENT)
Dept: GASTROENTEROLOGY | Facility: CLINIC | Age: 55
End: 2025-06-03
Payer: COMMERCIAL

## 2025-06-03 ENCOUNTER — TELEPHONE (OUTPATIENT)
Dept: GASTROENTEROLOGY | Facility: CLINIC | Age: 55
End: 2025-06-03
Payer: COMMERCIAL

## 2025-06-03 VITALS
DIASTOLIC BLOOD PRESSURE: 75 MMHG | WEIGHT: 201.8 LBS | TEMPERATURE: 97.4 F | SYSTOLIC BLOOD PRESSURE: 116 MMHG | HEIGHT: 70 IN | BODY MASS INDEX: 28.89 KG/M2 | HEART RATE: 55 BPM

## 2025-06-03 DIAGNOSIS — R10.13 EPIGASTRIC PAIN: ICD-10-CM

## 2025-06-03 DIAGNOSIS — K21.9 GASTROESOPHAGEAL REFLUX DISEASE, UNSPECIFIED WHETHER ESOPHAGITIS PRESENT: Primary | ICD-10-CM

## 2025-06-03 PROCEDURE — 99204 OFFICE O/P NEW MOD 45 MIN: CPT | Performed by: PHYSICIAN ASSISTANT

## 2025-06-03 NOTE — TELEPHONE ENCOUNTER
DAYO HATCH  for EGD on  08/26/2025 arrive at 9AM  . Gave Prep instructions in office. OK FOR THE HUB TO RELAY

## 2025-06-03 NOTE — PROGRESS NOTES
"Chief Complaint  Abdominal Pain    Subjective          History Of Present Illness:    Clarence Hoang is a  55 y.o. male patient with a past medical history of anxiety, tobacco use who presents as a new patient for evaluation of abdominal pain.    Patient reports in the past he was experiencing severe epigastric abdominal pain. He was told he had a hiatal hernia and years ago but never underwent any imaging or endoscopic evaluation. He was started on pantoprazole 40 mg daily at this time and has remained on this therapy for many years now. He is on famotidine during the day and at night. Patient denies any breakthrough symptoms currently. Patient denies any nausea, vomiting, dysphagia, abnormal weight loss, poor appetite.  Denies NSAID use.    He denies any black or bloody stools.    Patient vapes.  Prior cigarette smoker.  He denies any illicit drug use or alcohol use.    Family history of colon cancer, esophageal, or gastric cancer.  He reports his mother had a hiatal hernia.    Additional data reviewed:  Colonoscopy 2021 with Dr. Malik - normal     Objective   Vital Signs:   /75 (BP Location: Right arm, Patient Position: Sitting, Cuff Size: Adult)   Pulse 55   Temp 97.4 °F (36.3 °C) (Temporal)   Ht 177.8 cm (70\")   Wt 91.5 kg (201 lb 12.8 oz)   BMI 28.96 kg/m²       Physical Exam  Vitals reviewed.   Constitutional:       General: He is not in acute distress.     Appearance: Normal appearance. He is not ill-appearing.   HENT:      Head: Normocephalic and atraumatic.      Nose: Nose normal.      Mouth/Throat:      Pharynx: Oropharynx is clear.   Eyes:      Conjunctiva/sclera: Conjunctivae normal.   Pulmonary:      Effort: Pulmonary effort is normal.   Abdominal:      General: There is no distension.      Palpations: Abdomen is soft. There is no mass.      Tenderness: There is no abdominal tenderness.   Musculoskeletal:         General: No swelling. Normal range of motion.      Cervical back: Normal " range of motion.   Skin:     General: Skin is warm and dry.      Findings: No bruising or rash.   Neurological:      General: No focal deficit present.      Mental Status: He is alert and oriented to person, place, and time.      Motor: No weakness.      Gait: Gait normal.   Psychiatric:         Mood and Affect: Mood normal.          Result Review :   The following data was reviewed by: Anny Fernandez PA-C on 06/03/2025:  CMP          3/4/2025    08:26   CMP   Glucose 80    BUN 19    Creatinine 0.91    EGFR 100.2    Sodium 142    Potassium 4.1    Chloride 104    Calcium 9.2    Total Protein 6.6    Albumin 4.2    Globulin 2.4    Total Bilirubin 0.4    Alkaline Phosphatase 42    AST (SGOT) 22    ALT (SGPT) 24    Albumin/Globulin Ratio 1.8    BUN/Creatinine Ratio 20.9      CBC          3/4/2025    08:26   CBC   WBC 6.35    RBC 4.61    Hemoglobin 14.2    Hematocrit 42.9    MCV 93.1    MCH 30.8    MCHC 33.1    RDW 13.1    Platelets 195            Assessment and Plan    Diagnoses and all orders for this visit:    1. Gastroesophageal reflux disease, unspecified whether esophagitis present (Primary)  -     Case Request; Standing  -     Implement Anesthesia orders day of procedure.; Standing  -     Follow Anesthesia Guidelines / Protocol; Future  -     Obtain Informed Consent; Standing  -     Case Request    2. Epigastric pain  -     Case Request; Standing  -     Implement Anesthesia orders day of procedure.; Standing  -     Follow Anesthesia Guidelines / Protocol; Future  -     Obtain Informed Consent; Standing  -     Case Request       I recommend proceeding with upper endoscopy to rule out gastritis, esophagitis, AVM, peptic ulcer disease, Ernandez's esophagus, hiatal hernia, H. pylori infection, celiac disease, EOE or mass/malignancy. Risks (including, but not limited to perforation, bleeding, sedation-related complications, and death), benefits, and alternatives to the procedure were discussed with the patient and  all questions were answered. Patient is agreeable to plan of care.   Patient is not currently having pain on current therapy but is on quite high dosing of antacid therapy.  If he is able, would recommend reducing to monotherapy with pantoprazole only if tolerated.       Follow Up   Return for EGD, Dr. Banks.    Dragon dictation used throughout this note.            Anny Mcfarlane PA-C   [facility] [place], operated by [facility]  [address] [place] [address]  Office: [phone]

## 2025-06-06 NOTE — PROGRESS NOTES
Suyapa 3,2025    Hello, may I speak with Clarence Hoang?    My name is Yoko      I am  with [facility]  [address] [place] [id number] [phone].    Before we get started may I verify your date of birth? 1970    I am calling to officially welcome you to our practice and ask about your recent visit. Is this a good time to talk? yes    Tell me about your visit with us. What things went well?  It went fine. Everyone was really sweet.        We're always looking for ways to make our patients' experiences even better. Do you have recommendations on ways we may improve?  no    Overall were you satisfied with your first visit to our practice? yes       I appreciate you taking the time to speak with me today. Is there anything else I can do for you? no      Thank you, and have a great day.

## 2025-06-09 RX ORDER — FAMOTIDINE 20 MG/1
20 TABLET, FILM COATED ORAL 2 TIMES DAILY
Qty: 180 TABLET | Refills: 3 | OUTPATIENT
Start: 2025-06-09

## 2025-06-09 NOTE — TELEPHONE ENCOUNTER
Called and spoke with patient.  Megan atkins recommend starting the medication back up if GI stated not to take it.  He said he was in pain and is not going to go 3 months without it.  I advised him to call his GI and let them know, maybe thee is a different medication he can take.

## 2025-06-11 ENCOUNTER — TELEPHONE (OUTPATIENT)
Dept: GASTROENTEROLOGY | Facility: CLINIC | Age: 55
End: 2025-06-11
Payer: COMMERCIAL

## 2025-06-11 NOTE — TELEPHONE ENCOUNTER
Called pt, he states Pantoprazole QD is not controlling his heartburn sx.  Pt was taking Pepcid BID also, but his rx ran out.  Pt states he may need something stronger.  Advised will send a msg to MIRTHA Mccormick.

## 2025-06-11 NOTE — TELEPHONE ENCOUNTER
Patient left a voicemail wanting to know if he could take a medication in place of his last GERD medication because pantoprazole is not strong enough. Please call him back [phone].

## 2025-06-11 NOTE — TELEPHONE ENCOUNTER
Lets have him try pantoprazole twice daily for a little bit. He can use Pepcid 20 mg over the counter for breakthrough symptoms.

## 2025-06-20 ENCOUNTER — TELEPHONE (OUTPATIENT)
Dept: GASTROENTEROLOGY | Facility: CLINIC | Age: 55
End: 2025-06-20
Payer: COMMERCIAL

## 2025-06-20 NOTE — TELEPHONE ENCOUNTER
"    Caller: Clarence Hoang \"Norberto Hoang\"    Relationship to patient: Self    Best call back number: [phone]    Chief complaint: RESCHEDULE     Type of visit: SCOPE    Requested date: NEEDS SOONER    If rescheduling, when is the original appointment: 08/26/2025    "

## 2025-06-24 ENCOUNTER — TELEPHONE (OUTPATIENT)
Dept: GASTROENTEROLOGY | Facility: CLINIC | Age: 55
End: 2025-06-24
Payer: COMMERCIAL

## 2025-06-24 NOTE — TELEPHONE ENCOUNTER
PT TO BE RESCHEDULED TO [facility] DID NOT MEET MEDICAL NECESSITY FOR HOSPITAL PUT IN DEPOT WITH BANDAR SCHEDULING NOTIFIED TO CALL PT FOR RESCHEDULE

## 2025-06-24 NOTE — TELEPHONE ENCOUNTER
Advised that [facility] will call with final arrival time minimum 24 hrs before procedure. IF they do not get a phone call, arrival time will stay the same as given on instructions OK FOR THE HUB TO RELAY   06/25  Egd

## 2025-06-25 ENCOUNTER — OUTSIDE FACILITY SERVICE (OUTPATIENT)
Dept: GASTROENTEROLOGY | Facility: CLINIC | Age: 55
End: 2025-06-25
Payer: COMMERCIAL

## 2025-06-25 PROCEDURE — 43235 EGD DIAGNOSTIC BRUSH WASH: CPT | Performed by: INTERNAL MEDICINE

## 2025-06-30 DIAGNOSIS — F41.1 GENERALIZED ANXIETY DISORDER: ICD-10-CM

## 2025-06-30 RX ORDER — FAMOTIDINE 20 MG/1
20 TABLET, FILM COATED ORAL 2 TIMES DAILY
Qty: 180 TABLET | Refills: 3 | Status: SHIPPED | OUTPATIENT
Start: 2025-06-30

## 2025-06-30 RX ORDER — ALPRAZOLAM 0.5 MG
0.5 TABLET ORAL 3 TIMES DAILY PRN
Qty: 90 TABLET | Refills: 1 | Status: SHIPPED | OUTPATIENT
Start: 2025-06-30

## 2025-08-08 DIAGNOSIS — N42.9 DISORDER OF PROSTATE: ICD-10-CM

## 2025-08-08 RX ORDER — TADALAFIL 5 MG/1
5 TABLET ORAL DAILY PRN
Qty: 90 TABLET | Refills: 0 | Status: SHIPPED | OUTPATIENT
Start: 2025-08-08

## 2025-08-28 ENCOUNTER — OFFICE VISIT (OUTPATIENT)
Dept: INTERNAL MEDICINE | Facility: CLINIC | Age: 55
End: 2025-08-28
Payer: COMMERCIAL

## 2025-08-28 VITALS
SYSTOLIC BLOOD PRESSURE: 120 MMHG | RESPIRATION RATE: 16 BRPM | WEIGHT: 197 LBS | DIASTOLIC BLOOD PRESSURE: 64 MMHG | BODY MASS INDEX: 28.2 KG/M2 | HEIGHT: 70 IN

## 2025-08-28 DIAGNOSIS — F41.1 GENERALIZED ANXIETY DISORDER: Primary | ICD-10-CM

## 2025-08-28 DIAGNOSIS — F32.A MILD DEPRESSION: ICD-10-CM

## 2025-08-28 PROCEDURE — 99214 OFFICE O/P EST MOD 30 MIN: CPT | Performed by: NURSE PRACTITIONER

## 2025-08-28 RX ORDER — MIRTAZAPINE 7.5 MG/1
7.5 TABLET, FILM COATED ORAL NIGHTLY
Qty: 30 TABLET | Refills: 5 | Status: SHIPPED | OUTPATIENT
Start: 2025-08-28

## (undated) DEVICE — THE TORRENT IRRIGATION SCOPE CONNECTOR IS USED WITH THE TORRENT IRRIGATION TUBING TO PROVIDE IRRIGATION FLUIDS SUCH AS STERILE WATER DURING GASTROINTESTINAL ENDOSCOPIC PROCEDURES WHEN USED IN CONJUNCTION WITH AN IRRIGATION PUMP (OR ELECTROSURGICAL UNIT).: Brand: TORRENT

## (undated) DEVICE — CANN O2 ETCO2 FITS ALL CONN CO2 SMPL A/ 7IN DISP LF

## (undated) DEVICE — ADAPT CLN BIOGUARD AIR/H2O DISP

## (undated) DEVICE — TUBING, SUCTION, 1/4" X 10', STRAIGHT: Brand: MEDLINE

## (undated) DEVICE — LN SMPL CO2 SHTRM SD STREAM W/M LUER

## (undated) DEVICE — KT ORCA ORCAPOD DISP STRL

## (undated) DEVICE — SENSR O2 OXIMAX FNGR A/ 18IN NONSTR